# Patient Record
Sex: FEMALE | Race: BLACK OR AFRICAN AMERICAN | NOT HISPANIC OR LATINO | Employment: FULL TIME | URBAN - METROPOLITAN AREA
[De-identification: names, ages, dates, MRNs, and addresses within clinical notes are randomized per-mention and may not be internally consistent; named-entity substitution may affect disease eponyms.]

---

## 2023-04-07 ENCOUNTER — OFFICE VISIT (OUTPATIENT)
Dept: PRIMARY CARE | Facility: CLINIC | Age: 63
End: 2023-04-07
Payer: COMMERCIAL

## 2023-04-07 VITALS
BODY MASS INDEX: 23.23 KG/M2 | SYSTOLIC BLOOD PRESSURE: 127 MMHG | HEART RATE: 79 BPM | WEIGHT: 148 LBS | HEIGHT: 67 IN | TEMPERATURE: 98.1 F | DIASTOLIC BLOOD PRESSURE: 80 MMHG

## 2023-04-07 DIAGNOSIS — I10 PRIMARY HYPERTENSION: Chronic | ICD-10-CM

## 2023-04-07 DIAGNOSIS — D72.819 LEUKOPENIA, UNSPECIFIED TYPE: ICD-10-CM

## 2023-04-07 DIAGNOSIS — R73.03 PREDIABETES: ICD-10-CM

## 2023-04-07 DIAGNOSIS — E78.00 PURE HYPERCHOLESTEROLEMIA: Primary | ICD-10-CM

## 2023-04-07 DIAGNOSIS — M54.12 CERVICAL RADICULOPATHY: ICD-10-CM

## 2023-04-07 DIAGNOSIS — E55.9 VITAMIN D DEFICIENCY: ICD-10-CM

## 2023-04-07 PROBLEM — J30.9 ALLERGIC RHINITIS: Status: ACTIVE | Noted: 2023-04-07

## 2023-04-07 PROBLEM — E78.5 HYPERLIPEMIA: Status: ACTIVE | Noted: 2023-04-07

## 2023-04-07 PROBLEM — H81.10 BENIGN POSITIONAL VERTIGO: Status: ACTIVE | Noted: 2023-04-07

## 2023-04-07 PROBLEM — K64.9 HEMORRHOIDS: Status: ACTIVE | Noted: 2023-04-07

## 2023-04-07 PROBLEM — B35.6 TINEA CRURIS: Status: ACTIVE | Noted: 2023-04-07

## 2023-04-07 PROBLEM — K62.5 RECTAL BLEED: Status: ACTIVE | Noted: 2023-04-07

## 2023-04-07 PROBLEM — R93.1 ABNORMAL CARDIAC CT ANGIOGRAPHY: Status: ACTIVE | Noted: 2023-04-07

## 2023-04-07 PROCEDURE — 3079F DIAST BP 80-89 MM HG: CPT | Performed by: FAMILY MEDICINE

## 2023-04-07 PROCEDURE — 3074F SYST BP LT 130 MM HG: CPT | Performed by: FAMILY MEDICINE

## 2023-04-07 PROCEDURE — 99214 OFFICE O/P EST MOD 30 MIN: CPT | Performed by: FAMILY MEDICINE

## 2023-04-07 PROCEDURE — 1036F TOBACCO NON-USER: CPT | Performed by: FAMILY MEDICINE

## 2023-04-07 RX ORDER — ATORVASTATIN CALCIUM 40 MG/1
40 TABLET, FILM COATED ORAL NIGHTLY
COMMUNITY
End: 2023-04-25

## 2023-04-07 RX ORDER — EZETIMIBE 10 MG/1
10 TABLET ORAL DAILY
COMMUNITY
End: 2023-05-10

## 2023-04-07 RX ORDER — HYDROCHLOROTHIAZIDE 25 MG/1
50 TABLET ORAL
COMMUNITY
Start: 2016-02-25

## 2023-04-07 RX ORDER — ASPIRIN 81 MG/1
81 TABLET ORAL DAILY
COMMUNITY
Start: 2020-07-07

## 2023-04-07 ASSESSMENT — ENCOUNTER SYMPTOMS
OCCASIONAL FEELINGS OF UNSTEADINESS: 1
LOSS OF SENSATION IN FEET: 0
DEPRESSION: 0

## 2023-04-07 ASSESSMENT — PATIENT HEALTH QUESTIONNAIRE - PHQ9
1. LITTLE INTEREST OR PLEASURE IN DOING THINGS: NOT AT ALL
2. FEELING DOWN, DEPRESSED OR HOPELESS: NOT AT ALL
SUM OF ALL RESPONSES TO PHQ9 QUESTIONS 1 AND 2: 0

## 2023-04-07 NOTE — PROGRESS NOTES
Subjective   Patient ID: Tere Moreira is a 63 y.o. female who presents for Follow-up (Pain to right side).  HPI  The patient is here for her medication refills.  She had stopped Atorvastatin for muscle pain, but the pain is more related to her cervicalgia.    She I also here for right neck, right leg and arm pain that started about a year ago and is not better.  It radiates into the right hand.    A review of system was completed.  All systems were reviewed and were normal, except for the ones that are listed in the HPI.    Objective   Physical Exam  Constitutional:       Appearance: Normal appearance.   HENT:      Head: Normocephalic and atraumatic.      Right Ear: Tympanic membrane, ear canal and external ear normal.      Left Ear: Tympanic membrane, ear canal and external ear normal.      Nose: Nose normal.      Mouth/Throat:      Mouth: Mucous membranes are moist.      Pharynx: Oropharynx is clear.   Eyes:      Extraocular Movements: Extraocular movements intact.      Conjunctiva/sclera: Conjunctivae normal.      Pupils: Pupils are equal, round, and reactive to light.   Cardiovascular:      Rate and Rhythm: Normal rate and regular rhythm.      Pulses: Normal pulses.   Pulmonary:      Effort: Pulmonary effort is normal.      Breath sounds: Normal breath sounds.   Abdominal:      General: Abdomen is flat. Bowel sounds are normal.      Palpations: Abdomen is soft.   Musculoskeletal:         General: Normal range of motion.      Cervical back: Normal range of motion and neck supple.   Skin:     General: Skin is warm.   Neurological:      General: No focal deficit present.      Mental Status: She is alert and oriented to person, place, and time. Mental status is at baseline.   Psychiatric:         Mood and Affect: Mood normal.         Behavior: Behavior normal.         Thought Content: Thought content normal.         Judgment: Judgment normal.         Assessment/Plan   Problem List Items Addressed This Visit           Nervous    Cervical radiculopathy     -PT referral recommended today.  Ordered.   -OTC NSAIDs PRN for now.            Circulatory    HTN (hypertension) (Chronic)       Endocrine/Metabolic    Prediabetes    Vitamin D deficiency       Hematologic    Leucopenia       Other    Hyperlipemia - Primary

## 2023-06-21 ENCOUNTER — OFFICE VISIT (OUTPATIENT)
Dept: PRIMARY CARE | Facility: CLINIC | Age: 63
End: 2023-06-21
Payer: COMMERCIAL

## 2023-06-21 VITALS
HEART RATE: 75 BPM | BODY MASS INDEX: 23.34 KG/M2 | WEIGHT: 149 LBS | SYSTOLIC BLOOD PRESSURE: 100 MMHG | TEMPERATURE: 97.9 F | DIASTOLIC BLOOD PRESSURE: 62 MMHG

## 2023-06-21 DIAGNOSIS — G89.29 CHRONIC PAIN OF BOTH KNEES: Primary | ICD-10-CM

## 2023-06-21 DIAGNOSIS — M25.562 CHRONIC PAIN OF BOTH KNEES: Primary | ICD-10-CM

## 2023-06-21 DIAGNOSIS — M25.561 CHRONIC PAIN OF BOTH KNEES: Primary | ICD-10-CM

## 2023-06-21 PROCEDURE — 1036F TOBACCO NON-USER: CPT | Performed by: FAMILY MEDICINE

## 2023-06-21 PROCEDURE — 99214 OFFICE O/P EST MOD 30 MIN: CPT | Performed by: FAMILY MEDICINE

## 2023-06-21 PROCEDURE — 3078F DIAST BP <80 MM HG: CPT | Performed by: FAMILY MEDICINE

## 2023-06-21 PROCEDURE — 3074F SYST BP LT 130 MM HG: CPT | Performed by: FAMILY MEDICINE

## 2023-06-21 RX ORDER — DICLOFENAC SODIUM 10 MG/G
4 GEL TOPICAL 4 TIMES DAILY
Qty: 200 G | Refills: 5 | Status: SHIPPED | OUTPATIENT
Start: 2023-06-21 | End: 2023-10-05 | Stop reason: ALTCHOICE

## 2023-06-21 RX ORDER — IBUPROFEN 800 MG/1
800 TABLET ORAL EVERY 8 HOURS PRN
Qty: 60 TABLET | Refills: 5 | Status: SHIPPED | OUTPATIENT
Start: 2023-06-21 | End: 2023-07-21

## 2023-06-21 NOTE — PROGRESS NOTES
Subjective   Patient ID: Tere Moreira is a 63 y.o. female who presents for NEEDS A REFERRAL.  HPI    The patient is here for the management of her bilateral chronic knee pain.  They are progressively getting worse, mostly when m stairs. .  No local swelling.     A review of system was completed.  All systems were reviewed and were normal, except for the ones that are listed in the HPI.    Objective   Physical Exam  Constitutional:       Appearance: Normal appearance.   HENT:      Head: Normocephalic and atraumatic.      Right Ear: Tympanic membrane, ear canal and external ear normal.      Left Ear: Tympanic membrane, ear canal and external ear normal.      Nose: Nose normal.      Mouth/Throat:      Mouth: Mucous membranes are moist.      Pharynx: Oropharynx is clear.   Eyes:      Extraocular Movements: Extraocular movements intact.      Conjunctiva/sclera: Conjunctivae normal.      Pupils: Pupils are equal, round, and reactive to light.   Cardiovascular:      Rate and Rhythm: Normal rate and regular rhythm.      Pulses: Normal pulses.   Pulmonary:      Effort: Pulmonary effort is normal.      Breath sounds: Normal breath sounds.   Abdominal:      General: Abdomen is flat. Bowel sounds are normal.      Palpations: Abdomen is soft.   Musculoskeletal:         General: Normal range of motion.      Cervical back: Normal range of motion and neck supple.      Comments: -Bilateral grinding knees.  No edema.    Skin:     General: Skin is warm.   Neurological:      General: No focal deficit present.      Mental Status: She is alert and oriented to person, place, and time. Mental status is at baseline.   Psychiatric:         Mood and Affect: Mood normal.         Behavior: Behavior normal.         Thought Content: Thought content normal.         Judgment: Judgment normal.         Assessment/Plan   Problem List Items Addressed This Visit          Nervous    Chronic pain of both knees - Primary    Relevant Medications     diclofenac sodium (Voltaren) 1 % gel gel    ibuprofen 800 mg tablet    Other Relevant Orders    XR knee 1-2 views bilateral    Referral to Orthopaedic Surgery

## 2023-07-19 ENCOUNTER — OFFICE VISIT (OUTPATIENT)
Dept: PRIMARY CARE | Facility: CLINIC | Age: 63
End: 2023-07-19
Payer: COMMERCIAL

## 2023-07-19 VITALS
TEMPERATURE: 97.9 F | HEIGHT: 68 IN | DIASTOLIC BLOOD PRESSURE: 76 MMHG | BODY MASS INDEX: 22.58 KG/M2 | HEART RATE: 69 BPM | SYSTOLIC BLOOD PRESSURE: 129 MMHG | WEIGHT: 149 LBS

## 2023-07-19 DIAGNOSIS — L30.9 DERMATITIS: Primary | ICD-10-CM

## 2023-07-19 PROCEDURE — 3078F DIAST BP <80 MM HG: CPT | Performed by: FAMILY MEDICINE

## 2023-07-19 PROCEDURE — 3074F SYST BP LT 130 MM HG: CPT | Performed by: FAMILY MEDICINE

## 2023-07-19 PROCEDURE — 99214 OFFICE O/P EST MOD 30 MIN: CPT | Performed by: FAMILY MEDICINE

## 2023-07-19 PROCEDURE — 1036F TOBACCO NON-USER: CPT | Performed by: FAMILY MEDICINE

## 2023-07-19 RX ORDER — DESONIDE 0.5 MG/G
OINTMENT TOPICAL 2 TIMES DAILY
Qty: 30 G | Refills: 0 | Status: SHIPPED | OUTPATIENT
Start: 2023-07-19 | End: 2023-11-19

## 2023-07-19 RX ORDER — KETOCONAZOLE 20 MG/G
CREAM TOPICAL 2 TIMES DAILY
Qty: 15 G | Refills: 0 | Status: SHIPPED | OUTPATIENT
Start: 2023-07-19 | End: 2023-11-09

## 2023-07-19 RX ORDER — BETAMETHASONE DIPROPIONATE 0.5 MG/G
CREAM TOPICAL 2 TIMES DAILY PRN
Qty: 15 G | Refills: 0 | Status: SHIPPED | OUTPATIENT
Start: 2023-07-19 | End: 2023-11-09

## 2023-07-19 ASSESSMENT — PAIN SCALES - GENERAL: PAINLEVEL: 0-NO PAIN

## 2023-07-19 NOTE — PROGRESS NOTES
Subjective   Patient ID: Tere Moreira is a 63 y.o. female who presents for Rash.  Rash    64 yo AA female with a history of HLP,HTN, here for:   The patient is here for the management of a left palmar wrist rash that started about 2 weeks ago and is not responding to Desonide prescribed last year by her dermatologist for a similar rash on the right upper eyelid.      A review of system was completed.  All systems were reviewed and were normal, except for the ones that are listed in the HPI.    Objective   Physical Exam  Constitutional:       Appearance: Normal appearance.   HENT:      Head: Normocephalic and atraumatic.      Right Ear: Tympanic membrane, ear canal and external ear normal.      Left Ear: Tympanic membrane, ear canal and external ear normal.      Nose: Nose normal.      Mouth/Throat:      Mouth: Mucous membranes are moist.      Pharynx: Oropharynx is clear.   Eyes:      Extraocular Movements: Extraocular movements intact.      Conjunctiva/sclera: Conjunctivae normal.      Pupils: Pupils are equal, round, and reactive to light.   Cardiovascular:      Rate and Rhythm: Normal rate and regular rhythm.      Pulses: Normal pulses.   Pulmonary:      Effort: Pulmonary effort is normal.      Breath sounds: Normal breath sounds.   Abdominal:      General: Abdomen is flat. Bowel sounds are normal.      Palpations: Abdomen is soft.   Musculoskeletal:         General: Normal range of motion.      Cervical back: Normal range of motion and neck supple.   Skin:     General: Skin is warm.   Neurological:      General: No focal deficit present.      Mental Status: She is alert and oriented to person, place, and time. Mental status is at baseline.   Psychiatric:         Mood and Affect: Mood normal.         Behavior: Behavior normal.         Thought Content: Thought content normal.         Judgment: Judgment normal.         Assessment/Plan   Problem List Items Addressed This Visit       Dermatitis - Primary      -Etiology unclear.          Relevant Medications    desonide (DesOwen) 0.05 % ointment    betamethasone dipropionate 0.05 % cream    ketoconazole (NIZOral) 2 % cream

## 2023-10-05 ENCOUNTER — LAB (OUTPATIENT)
Dept: LAB | Facility: LAB | Age: 63
End: 2023-10-05
Payer: COMMERCIAL

## 2023-10-05 ENCOUNTER — OFFICE VISIT (OUTPATIENT)
Dept: PRIMARY CARE | Facility: CLINIC | Age: 63
End: 2023-10-05
Payer: COMMERCIAL

## 2023-10-05 VITALS
BODY MASS INDEX: 23.15 KG/M2 | WEIGHT: 150 LBS | HEART RATE: 69 BPM | TEMPERATURE: 98 F | SYSTOLIC BLOOD PRESSURE: 110 MMHG | DIASTOLIC BLOOD PRESSURE: 69 MMHG

## 2023-10-05 DIAGNOSIS — Z13.1 DIABETES MELLITUS SCREENING: ICD-10-CM

## 2023-10-05 DIAGNOSIS — E78.5 HYPERLIPIDEMIA, UNSPECIFIED: ICD-10-CM

## 2023-10-05 DIAGNOSIS — Z12.4 ENCOUNTER FOR PAPANICOLAOU SMEAR FOR CERVICAL CANCER SCREENING: ICD-10-CM

## 2023-10-05 DIAGNOSIS — Z13.220 SCREENING CHOLESTEROL LEVEL: ICD-10-CM

## 2023-10-05 DIAGNOSIS — E78.00 PURE HYPERCHOLESTEROLEMIA: ICD-10-CM

## 2023-10-05 DIAGNOSIS — D22.9 CHANGE IN SKIN MOLE: ICD-10-CM

## 2023-10-05 DIAGNOSIS — I10 PRIMARY HYPERTENSION: Chronic | ICD-10-CM

## 2023-10-05 DIAGNOSIS — Z11.4 ENCOUNTER FOR SCREENING FOR HIV: ICD-10-CM

## 2023-10-05 DIAGNOSIS — R73.03 PREDIABETES: ICD-10-CM

## 2023-10-05 DIAGNOSIS — Z78.0 ASYMPTOMATIC MENOPAUSE: ICD-10-CM

## 2023-10-05 DIAGNOSIS — Z12.31 VISIT FOR SCREENING MAMMOGRAM: ICD-10-CM

## 2023-10-05 DIAGNOSIS — Z11.59 ENCOUNTER FOR HEPATITIS C SCREENING TEST FOR LOW RISK PATIENT: ICD-10-CM

## 2023-10-05 DIAGNOSIS — Z23 IMMUNIZATION DUE: ICD-10-CM

## 2023-10-05 DIAGNOSIS — G89.29 CHRONIC PAIN OF BOTH KNEES: ICD-10-CM

## 2023-10-05 DIAGNOSIS — Z00.00 HEALTH CARE MAINTENANCE: Primary | ICD-10-CM

## 2023-10-05 DIAGNOSIS — I10 ESSENTIAL (PRIMARY) HYPERTENSION: ICD-10-CM

## 2023-10-05 DIAGNOSIS — Z00.00 HEALTH CARE MAINTENANCE: ICD-10-CM

## 2023-10-05 DIAGNOSIS — M25.561 CHRONIC PAIN OF BOTH KNEES: ICD-10-CM

## 2023-10-05 DIAGNOSIS — Z12.11 COLON CANCER SCREENING: ICD-10-CM

## 2023-10-05 DIAGNOSIS — M25.562 CHRONIC PAIN OF BOTH KNEES: ICD-10-CM

## 2023-10-05 PROCEDURE — 86803 HEPATITIS C AB TEST: CPT

## 2023-10-05 PROCEDURE — 90471 IMMUNIZATION ADMIN: CPT | Performed by: FAMILY MEDICINE

## 2023-10-05 PROCEDURE — 90472 IMMUNIZATION ADMIN EACH ADD: CPT | Performed by: FAMILY MEDICINE

## 2023-10-05 PROCEDURE — 99396 PREV VISIT EST AGE 40-64: CPT | Performed by: FAMILY MEDICINE

## 2023-10-05 PROCEDURE — 90715 TDAP VACCINE 7 YRS/> IM: CPT | Performed by: FAMILY MEDICINE

## 2023-10-05 PROCEDURE — 3078F DIAST BP <80 MM HG: CPT | Performed by: FAMILY MEDICINE

## 2023-10-05 PROCEDURE — 87389 HIV-1 AG W/HIV-1&-2 AB AG IA: CPT

## 2023-10-05 PROCEDURE — 84443 ASSAY THYROID STIM HORMONE: CPT

## 2023-10-05 PROCEDURE — 83036 HEMOGLOBIN GLYCOSYLATED A1C: CPT

## 2023-10-05 PROCEDURE — 80053 COMPREHEN METABOLIC PANEL: CPT

## 2023-10-05 PROCEDURE — 36415 COLL VENOUS BLD VENIPUNCTURE: CPT

## 2023-10-05 PROCEDURE — 90686 IIV4 VACC NO PRSV 0.5 ML IM: CPT | Performed by: FAMILY MEDICINE

## 2023-10-05 PROCEDURE — 1036F TOBACCO NON-USER: CPT | Performed by: FAMILY MEDICINE

## 2023-10-05 PROCEDURE — 85027 COMPLETE CBC AUTOMATED: CPT

## 2023-10-05 PROCEDURE — 3074F SYST BP LT 130 MM HG: CPT | Performed by: FAMILY MEDICINE

## 2023-10-05 PROCEDURE — 83721 ASSAY OF BLOOD LIPOPROTEIN: CPT

## 2023-10-05 RX ORDER — LOSARTAN POTASSIUM AND HYDROCHLOROTHIAZIDE 12.5; 5 MG/1; MG/1
1 TABLET ORAL DAILY
Qty: 90 TABLET | Refills: 1 | Status: SHIPPED | OUTPATIENT
Start: 2023-10-05 | End: 2024-04-08

## 2023-10-05 RX ORDER — ATORVASTATIN CALCIUM 40 MG/1
40 TABLET, FILM COATED ORAL NIGHTLY
Qty: 90 TABLET | Refills: 1 | Status: SHIPPED | OUTPATIENT
Start: 2023-10-05 | End: 2024-05-07

## 2023-10-05 RX ORDER — EZETIMIBE 10 MG/1
10 TABLET ORAL DAILY
Qty: 90 TABLET | Refills: 1 | Status: SHIPPED | OUTPATIENT
Start: 2023-10-05 | End: 2024-05-06

## 2023-10-05 NOTE — ASSESSMENT & PLAN NOTE
-Well controlled.  -Atorvastatin 40 mg at bedtime and Zetia 10 mg every day refilled.  -Blood test ordered.

## 2023-10-05 NOTE — ASSESSMENT & PLAN NOTE
-mammogram 8/2023- wnl.  -pap smear 2019- wnl- no HPV.  -colonoscopy 2020. Repeat in 10y.  -blood test ordered.  -Influenza and tdap vaccines given.  -COVID 19 and Shingrix to get at the pharmacy.  -Eye exam 8/2023.

## 2023-10-05 NOTE — PROGRESS NOTES
Subjective   Patient ID: Tere Moreira is a 63 y.o. female who presents for Annual Exam.  HPI    62 yo AA female with a history of HLP,HTN, here for:     1- CPE.   2-  Medications refill.  3-  orthopedic surgeon referral for the management of her chronic knee pain.  4- left anterior ankle pigmented mole that started to increase two years ago.     A review of system was completed.  All systems were reviewed and were normal, except for the ones that are listed in the HPI.    Objective   Physical Exam  Constitutional:       Appearance: Normal appearance.   HENT:      Head: Normocephalic and atraumatic.      Right Ear: Tympanic membrane, ear canal and external ear normal.      Left Ear: Tympanic membrane, ear canal and external ear normal.      Nose: Nose normal.      Mouth/Throat:      Mouth: Mucous membranes are moist.      Pharynx: Oropharynx is clear.   Eyes:      Extraocular Movements: Extraocular movements intact.      Conjunctiva/sclera: Conjunctivae normal.      Pupils: Pupils are equal, round, and reactive to light.   Cardiovascular:      Rate and Rhythm: Normal rate and regular rhythm.      Pulses: Normal pulses.   Pulmonary:      Effort: Pulmonary effort is normal.      Breath sounds: Normal breath sounds.   Abdominal:      General: Abdomen is flat. Bowel sounds are normal.      Palpations: Abdomen is soft.   Musculoskeletal:         General: Normal range of motion.      Cervical back: Normal range of motion and neck supple.   Skin:     General: Skin is warm.      Comments: - left anterior ankle pigmented mole that started to increase two years ago.  Measuring about 1 cm of diameter.      Neurological:      General: No focal deficit present.      Mental Status: She is alert and oriented to person, place, and time. Mental status is at baseline.   Psychiatric:         Mood and Affect: Mood normal.         Behavior: Behavior normal.         Thought Content: Thought content normal.         Judgment:  Judgment normal.     Assessment/Plan   Problem List Items Addressed This Visit       Hyperlipemia     -Well controlled.  -Atorvastatin 40 mg at bedtime and Zetia 10 mg every day refilled.  -Blood test ordered.          HTN (hypertension) (Chronic)     -well controlled.  -medications refilled: Losartan/ hydrochlorothiazide 50/12.5 mg every day.          Prediabetes    Relevant Medications    atorvastatin (Lipitor) 40 mg tablet    Chronic pain of both knees     -Knee X rays reviewed.  -ortho referral done.          Relevant Orders    Referral to Orthopaedic Surgery    Health care maintenance - Primary     -mammogram 8/2023- wnl.  -pap smear 2019- wnl- no HPV.  -colonoscopy 2020. Repeat in 10y.  -blood test ordered.  -Influenza and tdap vaccines given.  -COVID 19 and Shingrix to get at the pharmacy.  -Eye exam 8/2023.          Relevant Orders    CBC    Comprehensive Metabolic Panel    Hemoglobin A1C    TSH with reflex to Free T4 if abnormal    Hepatitis C Antibody    HIV 1/2 Antigen/Antibody Screen with Reflex to Confirmation    XR DEXA bone density    Visit for screening mammogram    Encounter for screening for HIV    Relevant Orders    HIV 1/2 Antigen/Antibody Screen with Reflex to Confirmation    Encounter for hepatitis C screening test for low risk patient    Relevant Orders    Hepatitis C Antibody    Asymptomatic menopause    Relevant Orders    XR DEXA bone density    Colon cancer screening    Screening cholesterol level    Diabetes mellitus screening    Encounter for Papanicolaou smear for cervical cancer screening    Immunization due    Relevant Medications    zoster vaccine-recombinant adjuvanted (Shingrix) 50 mcg/0.5 mL vaccine    Essential (primary) hypertension    Relevant Medications    losartan-hydrochlorothiazide (Hyzaar) 50-12.5 mg tablet    Hyperlipidemia, unspecified    Relevant Medications    ezetimibe (Zetia) 10 mg tablet    Other Relevant Orders    LDL cholesterol, direct    Change in skin mole      -Increased in size.  Measuring about 1 cm of diameter.          Relevant Orders    Referral to Dermatology      Patient to return to office in 6 months.

## 2023-10-06 LAB
ALBUMIN SERPL BCP-MCNC: 4.6 G/DL (ref 3.4–5)
ALP SERPL-CCNC: 50 U/L (ref 33–136)
ALT SERPL W P-5'-P-CCNC: 22 U/L (ref 7–45)
ANION GAP SERPL CALC-SCNC: 13 MMOL/L (ref 10–20)
AST SERPL W P-5'-P-CCNC: 27 U/L (ref 9–39)
BILIRUB SERPL-MCNC: 0.4 MG/DL (ref 0–1.2)
BUN SERPL-MCNC: 23 MG/DL (ref 6–23)
CALCIUM SERPL-MCNC: 9.9 MG/DL (ref 8.6–10.6)
CHLORIDE SERPL-SCNC: 101 MMOL/L (ref 98–107)
CO2 SERPL-SCNC: 29 MMOL/L (ref 21–32)
CREAT SERPL-MCNC: 0.89 MG/DL (ref 0.5–1.05)
ERYTHROCYTE [DISTWIDTH] IN BLOOD BY AUTOMATED COUNT: 13.4 % (ref 11.5–14.5)
EST. AVERAGE GLUCOSE BLD GHB EST-MCNC: 123 MG/DL
GFR SERPL CREATININE-BSD FRML MDRD: 73 ML/MIN/1.73M*2
GLUCOSE SERPL-MCNC: 84 MG/DL (ref 74–99)
HBA1C MFR BLD: 5.9 %
HCT VFR BLD AUTO: 39.1 % (ref 36–46)
HCV AB SER QL: NONREACTIVE
HGB BLD-MCNC: 12.3 G/DL (ref 12–16)
HIV 1+2 AB+HIV1 P24 AG SERPL QL IA: NONREACTIVE
LDLC SERPL DIRECT ASSAY-MCNC: 45 MG/DL (ref 0–129)
MCH RBC QN AUTO: 27.3 PG (ref 26–34)
MCHC RBC AUTO-ENTMCNC: 31.5 G/DL (ref 32–36)
MCV RBC AUTO: 87 FL (ref 80–100)
NRBC BLD-RTO: 0 /100 WBCS (ref 0–0)
PLATELET # BLD AUTO: 239 X10*3/UL (ref 150–450)
PMV BLD AUTO: 12.8 FL (ref 7.5–11.5)
POTASSIUM SERPL-SCNC: 4.3 MMOL/L (ref 3.5–5.3)
PROT SERPL-MCNC: 7.5 G/DL (ref 6.4–8.2)
RBC # BLD AUTO: 4.51 X10*6/UL (ref 4–5.2)
SODIUM SERPL-SCNC: 139 MMOL/L (ref 136–145)
TSH SERPL-ACNC: 1.77 MIU/L (ref 0.44–3.98)
WBC # BLD AUTO: 3.8 X10*3/UL (ref 4.4–11.3)

## 2023-11-07 DIAGNOSIS — L30.9 DERMATITIS: ICD-10-CM

## 2023-11-09 RX ORDER — KETOCONAZOLE 20 MG/G
CREAM TOPICAL 2 TIMES DAILY
Qty: 15 G | Refills: 1 | Status: SHIPPED | OUTPATIENT
Start: 2023-11-09

## 2023-11-09 RX ORDER — BETAMETHASONE DIPROPIONATE 0.5 MG/G
CREAM TOPICAL 2 TIMES DAILY PRN
Qty: 15 G | Refills: 1 | Status: SHIPPED | OUTPATIENT
Start: 2023-11-09 | End: 2024-11-08

## 2023-11-16 DIAGNOSIS — L30.9 DERMATITIS: ICD-10-CM

## 2023-11-19 RX ORDER — DESONIDE 0.5 MG/G
OINTMENT TOPICAL 2 TIMES DAILY
Qty: 30 G | Refills: 2 | Status: SHIPPED | OUTPATIENT
Start: 2023-11-19

## 2023-11-20 ENCOUNTER — OFFICE VISIT (OUTPATIENT)
Dept: ORTHOPEDIC SURGERY | Facility: HOSPITAL | Age: 63
End: 2023-11-20
Payer: COMMERCIAL

## 2023-11-20 VITALS — BODY MASS INDEX: 23.54 KG/M2 | HEIGHT: 67 IN | WEIGHT: 150 LBS

## 2023-11-20 DIAGNOSIS — M25.561 CHRONIC PAIN OF BOTH KNEES: ICD-10-CM

## 2023-11-20 DIAGNOSIS — M17.0 PRIMARY OSTEOARTHRITIS OF BOTH KNEES: Primary | ICD-10-CM

## 2023-11-20 DIAGNOSIS — G89.29 CHRONIC PAIN OF BOTH KNEES: ICD-10-CM

## 2023-11-20 DIAGNOSIS — M25.562 CHRONIC PAIN OF BOTH KNEES: ICD-10-CM

## 2023-11-20 PROCEDURE — 1036F TOBACCO NON-USER: CPT | Performed by: EMERGENCY MEDICINE

## 2023-11-20 PROCEDURE — 99214 OFFICE O/P EST MOD 30 MIN: CPT | Performed by: EMERGENCY MEDICINE

## 2023-11-20 PROCEDURE — 99204 OFFICE O/P NEW MOD 45 MIN: CPT | Performed by: EMERGENCY MEDICINE

## 2023-11-20 RX ORDER — MELOXICAM 7.5 MG/1
7.5 TABLET ORAL 2 TIMES DAILY PRN
Qty: 30 TABLET | Refills: 2 | Status: SHIPPED | OUTPATIENT
Start: 2023-11-20 | End: 2024-02-18

## 2023-11-20 ASSESSMENT — PAIN - FUNCTIONAL ASSESSMENT: PAIN_FUNCTIONAL_ASSESSMENT: 0-10

## 2023-11-20 ASSESSMENT — PAIN SCALES - GENERAL: PAINLEVEL_OUTOF10: 8

## 2023-11-20 ASSESSMENT — PAIN DESCRIPTION - DESCRIPTORS: DESCRIPTORS: SHARP

## 2023-11-20 NOTE — PROGRESS NOTES
"Subjective   Tere Moreira is a 63 y.o. female who presents for Pain of the Right Knee and Pain of the Left Knee    HPI  63-year-old female referred by Dr. Misael Cotto for bilateral knee pain that she had for the past 9 months.  Pain is exacerbated by stairs and deep knee flexion.  Pain is temporarily alleviated by rest.  Pain is associated with clicking and anterior knee irritation.  Pain is not associated with acute trauma, fall, ecchymosis, paresthesias, weakness, rash, erythema, or fevers.    ROS: All pertinent positive symptoms are included in the history of present illness.    All other systems have been reviewed and are negative and noncontributory to this patient's current ailments.    Objective     Vitals:    11/20/23 1353   Weight: 68 kg (150 lb)   Height: 1.702 m (5' 7\")       Physical Exam  General/Constitutional: No apparent distress. Well-nourished and well developed.  Head: Normocephalic, Atraumatic.   Eyes: EOMI.  Vascular: No edema, swelling or tenderness, except as noted in detailed exam.  Respiratory: Non-labored breathing.  Integumentary: No impressive skin lesions present, except as noted in detailed exam.  Neurological: Alert and oriented.  Psychological:  Normal mood and affect.  Musculoskeletal: Normal, except as noted in detailed exam.  Right Knee  Flexion 130. Extension 0. Crepitus present with knee flexion/extension. No ecchymosis. Muscle strength 5 out of 5 with flexion and extension. Lachman negative. Anterior drawer negative. Posterior drawer negative. Valgus stress negative. Varus stress negative.  Apprehension positive.    Left Knee  Flexion 130. Extension 0. Crepitus present with knee flexion/extension. No ecchymosis. Muscle strength 5 out of 5 with flexion and extension. Lachman negative. Anterior drawer negative. Posterior drawer negative. Valgus stress negative. Varus stress negative.  Apprehension positive.    XR KNEE 3 VIEWS BILATERAL    - Impression -  Patellofemoral " predominant degenerative changes of the knees.    Assessment/Plan   Problem List Items Addressed This Visit       Chronic pain of both knees     Other Visit Diagnoses       Primary osteoarthritis of both knees    -  Primary    Relevant Medications    meloxicam (Mobic) 7.5 mg tablet    Other Relevant Orders    Referral to Physical Therapy          I discussed with patient I feel that her symptoms are likely due to patellofemoral DJD bilaterally.  I feel that we can treat this conservatively with activity modifications, physical therapy, and analgesics.  We did discuss the option of therapeutic corticosteroid injection therapy, however she prefers to hold off at this time.  Therefore, we had a longer discussion regarding topical Voltaren gel versus oral meloxicam.  She would like to try oral meloxicam and physical therapy.  I have sent a prescription for meloxicam 7.5 mg twice daily for pain.  I have given her referral for physical therapy.  I like to see her back as needed pain persists or worsens.  She is not improving at time, we will likely again discussed the option of therapeutic corticosteroid junction therapy versus total joint placement.    Jaziel Ferguson,   Sports Medicine  [facility]     ** Please excuse any errors in grammar or translation related to this dictation. Voice recognition software was utilized to prepare this document. **

## 2024-01-02 ENCOUNTER — APPOINTMENT (OUTPATIENT)
Dept: PHYSICAL THERAPY | Facility: CLINIC | Age: 64
End: 2024-01-02
Payer: COMMERCIAL

## 2024-01-03 ENCOUNTER — EVALUATION (OUTPATIENT)
Dept: PHYSICAL THERAPY | Facility: CLINIC | Age: 64
End: 2024-01-03
Payer: COMMERCIAL

## 2024-01-03 DIAGNOSIS — R53.1 WEAKNESS: ICD-10-CM

## 2024-01-03 DIAGNOSIS — G89.29 CHRONIC PAIN OF BOTH KNEES: Primary | ICD-10-CM

## 2024-01-03 DIAGNOSIS — M25.562 CHRONIC PAIN OF BOTH KNEES: Primary | ICD-10-CM

## 2024-01-03 DIAGNOSIS — M17.0 PRIMARY OSTEOARTHRITIS OF BOTH KNEES: ICD-10-CM

## 2024-01-03 DIAGNOSIS — M25.561 CHRONIC PAIN OF BOTH KNEES: Primary | ICD-10-CM

## 2024-01-03 PROCEDURE — 97110 THERAPEUTIC EXERCISES: CPT | Mod: GP | Performed by: PHYSICAL THERAPIST

## 2024-01-03 PROCEDURE — 97161 PT EVAL LOW COMPLEX 20 MIN: CPT | Mod: GP | Performed by: PHYSICAL THERAPIST

## 2024-01-03 ASSESSMENT — ENCOUNTER SYMPTOMS
LOSS OF SENSATION IN FEET: 0
DEPRESSION: 0
OCCASIONAL FEELINGS OF UNSTEADINESS: 1

## 2024-01-03 NOTE — PROGRESS NOTES
Physical Therapy  Physical Therapy Orthopedic Evaluation    Patient Name: Tere Moreira  MRN: [id number]  Today's Date: 1/3/2024  Time Calculation  Start Time: 1546  Stop Time: 1626  Time Calculation (min): 40 min    Insurance:  Visit number: 1 of 60 (PT, OT, SP)  Authorization info: Auth REQ  Insurance Type: [employer]   Cert dates: 1/3/2024 - 4/2/2024    General:  Reason for visit: BL knee pain; BL patellofemoral DJD   Referred by: Dr. Ferguson    Current Problem  1. Chronic pain of both knees        2. Weakness  Referral to Physical Therapy      3. Primary osteoarthritis of both knees            Precautions: high blood pressure   Precautions  STEADI Fall Risk Score (The score of 4 or more indicates an increased risk of falling): 4    Medical History Form: Reviewed (scanned into chart)    Subjective:     Chief Complaint: Patient presents to clinic with BL knee pain. She used to perform step aerobics regularly. She now has difficulty with just about any exercise that she once used to be able to perform. She has noticed that in the past 2 years or so, she has had to modify all her exercises.   Onset Date: 1/1/2022  KRISTEN: Chronic    Current Condition:   Worse    Pain:  Pain Score: 10 - Worst possible pain  Pain Location: Knee10/10 without modification (pain is worst with chronic stair negotiation), at rest pain is 0/10  Location: anterior BL knees   Description: sharp, seldom experiences catching and clicking  Aggravating Factors: Stair Negotiation, Running, and Jumping  Relieving Factors:  Rest    Relevant Information (PMH & Previous Tests/Imaging): xray: Patellofemoral predominant degenerative changes of the knees.  (6/2023)  Previous Interventions/Treatments: None    Prior Level of Function (PLOF): 90%  Patient previously independent with all ADLs  Exercise/Physical Activity: exercises with modifications   Work/School: Teacher (Full time)    Patients Living Environment: Reviewed and no concern    Primary Language:  "English    Patient's Goal(s) for Therapy: \"Do burpees but realistically I would like to jog again\"    Red Flags: Do you have any of the following? No  Fever/chills, unexplained weight changes, dizziness/fainting, unexplained change in bowel or bladder functions, unexplained malaise or muscle weakness, night pain/sweats, numbness or tingling      Objective:  Objective       ROM    Knee AROM (Degrees)      (R)  (L)  Flexion: 130  128      Extension: 0  Lacking 3 degrees                   Strength Testing    Hip    (R)  (L)  Flexion: 4+/5  4+/5      Extension: 4/5  4/5     Abduction: 4-/5  4-/5     Adduction: 4+/5  4+/5           Knee    (R)  (L)  Flexion: 5-/5  5-/5      Extension: 4-/5  4-/5         Ankle  WNL        Functional Screening  Squat: WNL (Only reports muscle soreness from her workout this morning)  Lunge: unable to perform due to pain   SL Balance: able to hold about 5 seconds on RLE, and 10+ seconds on LLE   Lateral Step Down: Unable to perform   SL Quarter Squat: Unable to perform       Palpation: (-)      Flexibility:     R hamstring: WNL  L hamstring: WNL  R quad: Severe limitation   L quad: Severe limitation       Patella Mobility: Mild limitation in all directions BL       Ankle Joint Mobility: WNL      Orthotics: n/a      Gait: WNL          Special Tests  Meniscus   Lakesha Test: -   Apleys Test:    Thessaly Test: -  Patella   Apprehension Test: -   Grind Test: -    No signs or symptoms of DVT      Outcome Measures:  Other Measures  Lower Extremity Funtional Score (LEFS): 54/80     EDUCATION: home exercise program, plan of care, activity modifications, pain management, and injury pathology       Goals: Set and discussed today  Active       PT Problem       PT Goal 1       Start:  01/04/24    Expected End:  04/02/24       In 4 weeks, patient will improve L knee extension ROM to at least 0 degrees in order to progress towards heel to toe gait.   In 4 weeks, patient will improve SL balance to at " least 10 seconds on RLE in order to safely ambulate in community without being at a risk for falls.   In 4 weeks, patient will report a decrease in pain to <5/10 in order to tolerate exercise.   In 6 weeks, patient will demonstrate improved LE strength by ½ grade in order to perform reciprocal pattern with stair negotiation.   By discharge, patient will demonstrate improved LEFS by 9.  By discharge, patient will be independent with final HEP.                Plan of care was developed with input and agreement by the patient      Treatment Performed:  Access Code: [email or web address]  URL: [email or web address]  Date: 01/03/2024  Prepared by: Setfani Brown    Exercises  - Supine Active Straight Leg Raise  - 1 x daily - 7 x weekly - 2 sets - 10 reps  - Supine Bridge  - 1 x daily - 7 x weekly - 2 sets - 10 reps  - Clamshell  - 1 x daily - 7 x weekly - 2 sets - 10 reps  - Sidelying Hip Abduction  - 1 x daily - 7 x weekly - 2 sets - 10 reps  - Prone Quadriceps Stretch with Strap  - 1 x daily - 7 x weekly - 1 sets - 2 reps - 30-60 second hold      Assessment: Patient presents with BL knee pain, resulting in limited participation in pain-free ADLs and inability to perform at their prior level of function. Pt would benefit from physical therapy to address the impairments found & listed previously in the objective section in order to return to safe and pain-free ADLs and prior level of function.         Plan:     Planned Interventions include: therapeutic exercise, self-care home management, manual therapy, therapeutic activities, gait training, neuromuscular coordination, vasopneumatic, dry needling, aquatic therapy  Frequency: 1-2 x Week  Duration: 8 Weeks      Stefani Brown, PT

## 2024-01-04 PROBLEM — M17.0 PRIMARY OSTEOARTHRITIS OF BOTH KNEES: Status: ACTIVE | Noted: 2024-01-04

## 2024-01-04 ASSESSMENT — PAIN SCALES - GENERAL: PAINLEVEL_OUTOF10: 10 - WORST POSSIBLE PAIN

## 2024-01-09 ENCOUNTER — APPOINTMENT (OUTPATIENT)
Dept: PHYSICAL THERAPY | Facility: CLINIC | Age: 64
End: 2024-01-09
Payer: COMMERCIAL

## 2024-01-15 ENCOUNTER — APPOINTMENT (OUTPATIENT)
Dept: PHYSICAL THERAPY | Facility: CLINIC | Age: 64
End: 2024-01-15
Payer: COMMERCIAL

## 2024-01-16 ENCOUNTER — APPOINTMENT (OUTPATIENT)
Dept: PHYSICAL THERAPY | Facility: CLINIC | Age: 64
End: 2024-01-16
Payer: COMMERCIAL

## 2024-01-22 ENCOUNTER — APPOINTMENT (OUTPATIENT)
Dept: PHYSICAL THERAPY | Facility: CLINIC | Age: 64
End: 2024-01-22
Payer: COMMERCIAL

## 2024-01-29 ENCOUNTER — APPOINTMENT (OUTPATIENT)
Dept: PHYSICAL THERAPY | Facility: CLINIC | Age: 64
End: 2024-01-29
Payer: COMMERCIAL

## 2024-01-30 ENCOUNTER — APPOINTMENT (OUTPATIENT)
Dept: PHYSICAL THERAPY | Facility: CLINIC | Age: 64
End: 2024-01-30
Payer: COMMERCIAL

## 2024-02-06 ENCOUNTER — APPOINTMENT (OUTPATIENT)
Dept: PHYSICAL THERAPY | Facility: CLINIC | Age: 64
End: 2024-02-06
Payer: COMMERCIAL

## 2024-02-12 ENCOUNTER — TELEPHONE (OUTPATIENT)
Dept: PRIMARY CARE | Facility: CLINIC | Age: 64
End: 2024-02-12
Payer: COMMERCIAL
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
## 2024-02-13 ENCOUNTER — TELEPHONE (OUTPATIENT)
Dept: PRIMARY CARE | Facility: CLINIC | Age: 64
End: 2024-02-13
Payer: COMMERCIAL

## 2024-02-13 NOTE — TELEPHONE ENCOUNTER
If the patient thinks that she received the saliva into her eyes, hernoseormouth she should wait for signs of infection.  If she noticed any, she should notify the office so that we can treat it accordingly.  Otherwise the most common infections like HIV, hepatitis C, are transmitted through blood to blood contact for the most part.
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
## 2024-03-04 ENCOUNTER — LAB (OUTPATIENT)
Dept: LAB | Facility: LAB | Age: 64
End: 2024-03-04
Payer: COMMERCIAL

## 2024-03-04 ENCOUNTER — OFFICE VISIT (OUTPATIENT)
Dept: PRIMARY CARE | Facility: CLINIC | Age: 64
End: 2024-03-04
Payer: COMMERCIAL

## 2024-03-04 VITALS
DIASTOLIC BLOOD PRESSURE: 69 MMHG | HEART RATE: 76 BPM | BODY MASS INDEX: 23.02 KG/M2 | WEIGHT: 147 LBS | SYSTOLIC BLOOD PRESSURE: 128 MMHG | TEMPERATURE: 98 F

## 2024-03-04 DIAGNOSIS — N30.00 ACUTE CYSTITIS WITHOUT HEMATURIA: Primary | ICD-10-CM

## 2024-03-04 DIAGNOSIS — N30.00 ACUTE CYSTITIS WITHOUT HEMATURIA: ICD-10-CM

## 2024-03-04 PROCEDURE — 87186 SC STD MICRODIL/AGAR DIL: CPT

## 2024-03-04 PROCEDURE — 81001 URINALYSIS AUTO W/SCOPE: CPT

## 2024-03-04 PROCEDURE — 87086 URINE CULTURE/COLONY COUNT: CPT

## 2024-03-04 PROCEDURE — 3074F SYST BP LT 130 MM HG: CPT | Performed by: FAMILY MEDICINE

## 2024-03-04 PROCEDURE — 1036F TOBACCO NON-USER: CPT | Performed by: FAMILY MEDICINE

## 2024-03-04 PROCEDURE — 3078F DIAST BP <80 MM HG: CPT | Performed by: FAMILY MEDICINE

## 2024-03-04 PROCEDURE — 1126F AMNT PAIN NOTED NONE PRSNT: CPT | Performed by: FAMILY MEDICINE

## 2024-03-04 PROCEDURE — 99213 OFFICE O/P EST LOW 20 MIN: CPT | Performed by: FAMILY MEDICINE

## 2024-03-04 RX ORDER — NITROFURANTOIN 25; 75 MG/1; MG/1
100 CAPSULE ORAL 2 TIMES DAILY
Qty: 20 CAPSULE | Refills: 0 | Status: SHIPPED | OUTPATIENT
Start: 2024-03-04 | End: 2024-03-14

## 2024-03-04 NOTE — PROGRESS NOTES
Subjective   Patient ID: Tere Moreira is a 64 y.o. female who presents for UTI.    HPI    65 yo AA female with a history of HLP,HTN, here for a UTI that started yesterday.       A review of system was completed.  All systems were reviewed and were normal, except for the ones that are listed in the HPI.    Objective   Physical Exam  Constitutional:       Appearance: Normal appearance.   HENT:      Head: Normocephalic and atraumatic.      Right Ear: Tympanic membrane, ear canal and external ear normal.      Left Ear: Tympanic membrane, ear canal and external ear normal.      Nose: Nose normal.      Mouth/Throat:      Mouth: Mucous membranes are moist.      Pharynx: Oropharynx is clear.   Eyes:      Extraocular Movements: Extraocular movements intact.      Conjunctiva/sclera: Conjunctivae normal.      Pupils: Pupils are equal, round, and reactive to light.   Cardiovascular:      Rate and Rhythm: Normal rate and regular rhythm.      Pulses: Normal pulses.   Pulmonary:      Effort: Pulmonary effort is normal.      Breath sounds: Normal breath sounds.   Abdominal:      General: Abdomen is flat. Bowel sounds are normal.      Palpations: Abdomen is soft.   Musculoskeletal:         General: Normal range of motion.      Cervical back: Normal range of motion and neck supple.   Skin:     General: Skin is warm.   Neurological:      General: No focal deficit present.      Mental Status: She is alert and oriented to person, place, and time. Mental status is at baseline.   Psychiatric:         Mood and Affect: Mood normal.         Behavior: Behavior normal.         Thought Content: Thought content normal.         Judgment: Judgment normal.     Assessment/Plan   Problem List Items Addressed This Visit       Acute cystitis without hematuria - Primary     -UA w/ C+S ordered.  -Macrobid 100 mg BID for 10 days empirically started.         Relevant Medications    nitrofurantoin, macrocrystal-monohydrate, (Macrobid) 100 mg capsule     Other Relevant Orders    Urine Culture    POCT UA Automated manually resulted    Patient to return to office if not better in 1 week.

## 2024-03-05 LAB
APPEARANCE UR: CLEAR
BACTERIA #/AREA URNS AUTO: ABNORMAL /HPF
BILIRUB UR STRIP.AUTO-MCNC: NEGATIVE MG/DL
COLOR UR: ABNORMAL
GLUCOSE UR STRIP.AUTO-MCNC: NORMAL MG/DL
KETONES UR STRIP.AUTO-MCNC: NEGATIVE MG/DL
LEUKOCYTE ESTERASE UR QL STRIP.AUTO: ABNORMAL
MUCOUS THREADS #/AREA URNS AUTO: ABNORMAL /LPF
NITRITE UR QL STRIP.AUTO: NEGATIVE
PH UR STRIP.AUTO: 5.5 [PH]
PROT UR STRIP.AUTO-MCNC: NEGATIVE MG/DL
RBC # UR STRIP.AUTO: ABNORMAL /UL
RBC #/AREA URNS AUTO: >20 /HPF
SP GR UR STRIP.AUTO: 1.02
UROBILINOGEN UR STRIP.AUTO-MCNC: NORMAL MG/DL
WBC #/AREA URNS AUTO: >50 /HPF
WBC CLUMPS #/AREA URNS AUTO: ABNORMAL /HPF

## 2024-03-07 LAB — BACTERIA UR CULT: ABNORMAL

## 2024-03-15 ENCOUNTER — TELEPHONE (OUTPATIENT)
Dept: PRIMARY CARE | Facility: CLINIC | Age: 64
End: 2024-03-15
Payer: COMMERCIAL

## 2024-03-15 DIAGNOSIS — M25.561 CHRONIC PAIN OF BOTH KNEES: ICD-10-CM

## 2024-03-15 DIAGNOSIS — G89.29 CHRONIC PAIN OF BOTH KNEES: ICD-10-CM

## 2024-03-15 DIAGNOSIS — D22.9 CHANGE IN SKIN MOLE: Primary | ICD-10-CM

## 2024-03-15 DIAGNOSIS — M25.562 CHRONIC PAIN OF BOTH KNEES: ICD-10-CM

## 2024-03-15 NOTE — TELEPHONE ENCOUNTER
The patient called requesting an updated referral for physical therapy and a referral for a dermatologist

## 2024-03-19 ENCOUNTER — HOSPITAL ENCOUNTER (OUTPATIENT)
Dept: RADIOLOGY | Facility: CLINIC | Age: 64
Discharge: HOME | End: 2024-03-19
Payer: COMMERCIAL

## 2024-03-19 DIAGNOSIS — Z78.0 ASYMPTOMATIC MENOPAUSE: ICD-10-CM

## 2024-03-19 DIAGNOSIS — Z00.00 HEALTH CARE MAINTENANCE: ICD-10-CM

## 2024-03-19 PROCEDURE — 77080 DXA BONE DENSITY AXIAL: CPT | Performed by: RADIOLOGY

## 2024-03-19 PROCEDURE — 77080 DXA BONE DENSITY AXIAL: CPT

## 2024-04-01 ENCOUNTER — TELEPHONE (OUTPATIENT)
Dept: PRIMARY CARE | Facility: CLINIC | Age: 64
End: 2024-04-01

## 2024-04-04 DIAGNOSIS — I10 ESSENTIAL (PRIMARY) HYPERTENSION: ICD-10-CM

## 2024-04-05 ENCOUNTER — EVALUATION (OUTPATIENT)
Dept: PHYSICAL THERAPY | Facility: CLINIC | Age: 64
End: 2024-04-05
Payer: COMMERCIAL

## 2024-04-05 ENCOUNTER — OFFICE VISIT (OUTPATIENT)
Dept: PRIMARY CARE | Facility: CLINIC | Age: 64
End: 2024-04-05
Payer: COMMERCIAL

## 2024-04-05 ENCOUNTER — LAB (OUTPATIENT)
Dept: LAB | Facility: LAB | Age: 64
End: 2024-04-05
Payer: COMMERCIAL

## 2024-04-05 VITALS
HEART RATE: 69 BPM | BODY MASS INDEX: 23.49 KG/M2 | TEMPERATURE: 98 F | DIASTOLIC BLOOD PRESSURE: 66 MMHG | WEIGHT: 150 LBS | SYSTOLIC BLOOD PRESSURE: 136 MMHG

## 2024-04-05 DIAGNOSIS — Z12.31 VISIT FOR SCREENING MAMMOGRAM: ICD-10-CM

## 2024-04-05 DIAGNOSIS — G89.29 CHRONIC PAIN OF BOTH KNEES: ICD-10-CM

## 2024-04-05 DIAGNOSIS — R35.0 INCREASED URINARY FREQUENCY: ICD-10-CM

## 2024-04-05 DIAGNOSIS — R35.0 INCREASED URINARY FREQUENCY: Primary | ICD-10-CM

## 2024-04-05 DIAGNOSIS — M25.561 CHRONIC PAIN OF BOTH KNEES: ICD-10-CM

## 2024-04-05 DIAGNOSIS — M25.562 CHRONIC PAIN OF BOTH KNEES: ICD-10-CM

## 2024-04-05 LAB
APPEARANCE UR: CLEAR
BILIRUB UR STRIP.AUTO-MCNC: NEGATIVE MG/DL
COLOR UR: COLORLESS
GLUCOSE UR STRIP.AUTO-MCNC: NORMAL MG/DL
KETONES UR STRIP.AUTO-MCNC: NEGATIVE MG/DL
LEUKOCYTE ESTERASE UR QL STRIP.AUTO: NEGATIVE
NITRITE UR QL STRIP.AUTO: NEGATIVE
PH UR STRIP.AUTO: 6.5 [PH]
PROT UR STRIP.AUTO-MCNC: NEGATIVE MG/DL
RBC # UR STRIP.AUTO: NEGATIVE /UL
SP GR UR STRIP.AUTO: 1.01
UROBILINOGEN UR STRIP.AUTO-MCNC: NORMAL MG/DL

## 2024-04-05 PROCEDURE — 1159F MED LIST DOCD IN RCRD: CPT | Performed by: FAMILY MEDICINE

## 2024-04-05 PROCEDURE — 97535 SELF CARE MNGMENT TRAINING: CPT | Mod: GP | Performed by: PHYSICAL THERAPIST

## 2024-04-05 PROCEDURE — 97110 THERAPEUTIC EXERCISES: CPT | Mod: GP | Performed by: PHYSICAL THERAPIST

## 2024-04-05 PROCEDURE — 81003 URINALYSIS AUTO W/O SCOPE: CPT

## 2024-04-05 PROCEDURE — 97161 PT EVAL LOW COMPLEX 20 MIN: CPT | Mod: GP | Performed by: PHYSICAL THERAPIST

## 2024-04-05 PROCEDURE — 99214 OFFICE O/P EST MOD 30 MIN: CPT | Performed by: FAMILY MEDICINE

## 2024-04-05 PROCEDURE — 87086 URINE CULTURE/COLONY COUNT: CPT

## 2024-04-05 PROCEDURE — 3075F SYST BP GE 130 - 139MM HG: CPT | Performed by: FAMILY MEDICINE

## 2024-04-05 PROCEDURE — 3078F DIAST BP <80 MM HG: CPT | Performed by: FAMILY MEDICINE

## 2024-04-05 RX ORDER — TOLTERODINE 4 MG/1
4 CAPSULE, EXTENDED RELEASE ORAL DAILY
Qty: 30 CAPSULE | Refills: 5 | Status: SHIPPED | OUTPATIENT
Start: 2024-04-05 | End: 2024-10-02

## 2024-04-05 ASSESSMENT — PAIN - FUNCTIONAL ASSESSMENT: PAIN_FUNCTIONAL_ASSESSMENT: 0-10

## 2024-04-05 ASSESSMENT — ENCOUNTER SYMPTOMS
LOSS OF SENSATION IN FEET: 0
OCCASIONAL FEELINGS OF UNSTEADINESS: 1
DEPRESSION: 0

## 2024-04-05 ASSESSMENT — PAIN SCALES - GENERAL: PAINLEVEL_OUTOF10: 0 - NO PAIN

## 2024-04-05 NOTE — PROGRESS NOTES
Physical Therapy  Physical Therapy Orthopedic Evaluation    Patient Name: Tere Moreira  MRN: [id number]  Today's Date: 4/5/2024  Time Calculation  Start Time: 0745  Stop Time: 0830  Time Calculation (min): 45 min    Insurance:  Visit number: 1 of 5  Authorization info: Auth Needed  Insurance Type: [place]  Cert start date: 4/5/24; Cert end date: 6/3/24     General:  Reason for visit: Chronic pain B knees  Referred by: Dr. Iman Jack    Assessment: Patient presents with signs and symptoms consistent with B knee OA and PFJ involvement, resulting in limited participation in pain-free ADLs and inability to perform at their prior level of function. Pt would benefit from physical therapy to address the impairments found & listed previously in the objective section in order to return to safe and pain-free ADLs and prior level of function.       Plan:     Planned Interventions include: therapeutic exercise, self-care home management, manual therapy, therapeutic activities, gait training, neuromuscular coordination, vasopneumatic, dry needling, aquatic therapy  Frequency: 1 x Week  Duration: 8 Weeks    Current Problem:  1. Chronic pain of both knees  Referral to Physical Therapy    Follow Up In Physical Therapy          Precautions:   Precautions  STEADI Fall Risk Score (The score of 4 or more indicates an increased risk of falling): 3  Precautions Comment: HTN, HLD      Medical History Form: Reviewed (scanned into chart)    Subjective:   Subjective   Chief Complaint: Pt presents to PT with c/o chronic B knee pain beginning 3 years ago, attributes onset to exercise (step aerobics, plyometrics, running) and age. Pt continues to work out, however, modifies everything. Of note, recently received PT at this clinic in January 2024, had to discontinue d/t illness/other circumstances.  Onset: 3 years ago  KRISTEN: Exercise/aging    Current Condition:   Worse    Pain:  Pain Assessment: 0-10  Pain Score: 0 - No  "pain  Location: anterior B knee  Description: sharp, shooting; denies n/t, denies swelling, reports occ pain-free clicking  Aggravating Factors:  stair negotiation (modifies), running, jumping, pain worse in the evening  Relieving Factors:  rest  Pain Intensity: Best - 0/10, Worst - 6/10    Relevant Information (PMH & Previous Tests/Imaging): HBP, HLD, h/o R sciatica for past 6-8 months  B Knee XR: Patellofemoral predominant degenerative changes of the knees (6/2023)   Previous Interventions/Treatments: 1 PT session in January (no longer continuing PT exercises)    Prior Level of Function (PLOF)  Patient previously independent with all ADLs  Exercise/Physical Activity: step aerobics, plyometrics, running; upper body strengthening, modified HIIT work outs (feels strain the next day), walking regularly  Work/School:  [employer]    Patients Living Environment: Reviewed and no concern    Primary Language: English    Patient's Goal(s) for Therapy: \"To alleviate the pain so I can move more freely\"    Patient Specific Functional Scale (0 = unable to perform; 10 = able to perform activity at same level as before injury or problem)  Activity Current Follow Up Discharge   Stair negotiation 5     Plyometrics  0           Total score = sum of the activity scores/number of activities  Minimum detectable change (90%CI) for average score = 2 points  Minimum detectable change (90%CI) for single activity score = 3 points    Red Flags: Do you have any of the following? No  Fever/chills, unexplained weight changes, dizziness/fainting, unexplained change in bowel or bladder functions, unexplained malaise or muscle weakness, night pain/sweats, numbness or tingling    Objective:  Objective     Posture: Rounded shoulders, iliac crest = height    Palpation: Denies TTP    Knee AROM  Flexion R 139 deg; L 140 deg  Extension R 4 deg; L 6 deg    Hip AROM  Grossly WFL, except B hip ER/Ext mildly limited    Lower Extremity " MMT  Flexion R 4/5; L 4+/5  Abduction R 4-/5; L 4-/5  Extension R 4-/5; L 4-/5  ER R 4/5; L 4/5  IR R 4/5; L 4/5  Knee flexion R 4+/5; L 4+/5  Knee extension R 4/5; L 4+/5    SL Heel Raises  R 18 (partial range)   L 10 (partial range)    Gastroc Flexibility: R 2 deg L 10  HS Flexibility: R mildly limited L WFL  RF Flexibility: R moderately limited L moderately limited    Special Tests:  Varus 0 deg R (-); L (-)  Varus 25 deg R (-); L (-)  Valgus 0 deg R (-); L (-)  Valgus 25 deg R (-); L (-)  Patellar Apprehension R (-); L (-)  Patellar Grind R (-); L (-)  Anterior Drawer R (-); L (-)    Gait Analysis: unremarkable  Transfers: unremrable    Balance:   SL Balance: R <4s; L <3s    Joint Mobility: patella hypomobile sup/inf    DL Squat: increased fwd trunk lean (denies pain)    Outcome Measures:  Other Measures  Lower Extremity Funtional Score (LEFS): 61/80     EDUCATION: Home exercise program, plan of care, activity modifications, pain management, and injury pathology       Goals: Set and discussed today  Active       PT Problem       PT Goal 1       Start:  04/05/24    Expected End:  07/04/24       In 4 weeks, patient will improve SL balance to at least 10 seconds on RLE in order to safely ambulate in community without being at a risk for falls.   In 4 weeks, patient will report a decrease in pain to <3/10 in order to tolerate exercise.   In 6 weeks, patient will demonstrate improved LE strength by ½ grade in order to perform reciprocal pattern with stair negotiation.   In 6 weeks, patient will demonstrate improved SL heel raise 15 R/L through full ROM to improve ease exercise,  By discharge, patient will demonstrate improved LEFS by 9.  By discharge, patient will be independent with final HEP.                Plan of care was developed with input and agreement by the patient    Treatment Performed:  HEP: SLR, SL hip abd, clamshell, prone hip ext, heel raises, prone quad stretch, standing gastroc stretch  Access Code:  [email or web address]    Charges:  Evaluation: low complexity  Treatment: 1 TE, 1 self care    Callie Weller, PT

## 2024-04-05 NOTE — PROGRESS NOTES
Physical Therapy  Physical Therapy Treatment    Patient Name: Tere Moreira  MRN: [id number]  Today's Date: 4/5/2024       Insurance:  Visit number: 1 of ?  Authorization info: Auth Needed  Insurance Type: [place]  Cert start date: 4/5/24; Cert end date: 7/3/24      General:  Reason for visit: Chronic pain B knees  Referred by: Dr. Iman Jack    Assessment: ***       Plan: ***       Current Problem  No diagnosis found.    Precautions:        Subjective:  Subjective   ***    Pain       Performing HEP?: {Yes/No:28906}    Objective:  Objective   ***    Treatments:  Exercise:  - Upright bike  - Standing hip abd  - Standing hip ext  - Partial range wall squat   - TKE  - Heel raises  - DBE  - Tandem stance with KB pass  - SL press partial range    HEP: SLR, SL hip abd, clamshell, prone hip ext, heel raises, prone quad stretch, standing gastroc stretch  Access Code: [email or web address]    Callie Weller, PT

## 2024-04-05 NOTE — PROGRESS NOTES
Subjective   Patient ID: Tere Moreira is a 64 y.o. female who presents for Follow-up.  HPI      63 yo AA female with a history of HLP,HTN,  here for the management of:    1- an increased urinary frequency that started about a year ago and did not stopped after the UTI last treated a month ago.  Her A1C was at 5.9% when tested 4-5  months ago.  Unchanged from her baseline.  2- Dexa scan result. It was normal     A review of system was completed.  All systems were reviewed and were normal, except for the ones that are listed in the HPI.    Objective   Physical Exam  Constitutional:       Appearance: Normal appearance.   HENT:      Head: Normocephalic and atraumatic.      Right Ear: Tympanic membrane, ear canal and external ear normal.      Left Ear: Tympanic membrane, ear canal and external ear normal.      Nose: Nose normal.      Mouth/Throat:      Mouth: Mucous membranes are moist.      Pharynx: Oropharynx is clear.   Eyes:      Extraocular Movements: Extraocular movements intact.      Conjunctiva/sclera: Conjunctivae normal.      Pupils: Pupils are equal, round, and reactive to light.   Cardiovascular:      Rate and Rhythm: Normal rate and regular rhythm.      Pulses: Normal pulses.   Pulmonary:      Effort: Pulmonary effort is normal.      Breath sounds: Normal breath sounds.   Abdominal:      General: Abdomen is flat. Bowel sounds are normal.      Palpations: Abdomen is soft.   Musculoskeletal:         General: Normal range of motion.      Cervical back: Normal range of motion and neck supple.   Skin:     General: Skin is warm.   Neurological:      General: No focal deficit present.      Mental Status: She is alert and oriented to person, place, and time. Mental status is at baseline.   Psychiatric:         Mood and Affect: Mood normal.         Behavior: Behavior normal.         Thought Content: Thought content normal.         Judgment: Judgment normal.     Assessment/Plan   Problem List Items Addressed  This Visit       Visit for screening mammogram    Increased urinary frequency - Primary     -UA w/ C+S ordered.  -Detrol LA 4 mg every day started today.  Side effects discussed.          Relevant Medications    tolterodine LA (Detrol LA) 4 mg 24 hr capsule    Other Relevant Orders    POCT UA Automated manually resulted    Urine Culture    BI mammo bilateral screening tomosynthesis      Patient to return to office in 4- 6 weeks.  Sooner if needed.

## 2024-04-06 LAB — BACTERIA UR CULT: NORMAL

## 2024-04-08 RX ORDER — LOSARTAN POTASSIUM AND HYDROCHLOROTHIAZIDE 12.5; 5 MG/1; MG/1
1 TABLET ORAL DAILY
Qty: 90 TABLET | Refills: 1 | Status: SHIPPED | OUTPATIENT
Start: 2024-04-08

## 2024-04-09 ENCOUNTER — APPOINTMENT (OUTPATIENT)
Dept: PHYSICAL THERAPY | Facility: CLINIC | Age: 64
End: 2024-04-09
Payer: COMMERCIAL

## 2024-04-10 NOTE — PROGRESS NOTES
Subjective   HPI: Tere Moreira is a 64 y.o. female new patient who presents in office for evaluation and treatment of 2 moles (one on each leg).  No significant change.  The 1 on the left anterior ankle has maybe grown in size a little bit.  Her PCP recommended seeing Derm for evaluation.    ROS: No other skin or systemic complaints other than what is documented elsewhere in the note.    ALLERGIES: Benzoyl peroxide    SOCIAL:  reports that she has never smoked. She has never used smokeless tobacco. She reports that she does not currently use alcohol. She reports that she does not use drugs.    Objective   Left Ankle - Anterior, Left Thigh - Anterior, Right Thigh - Anterior  Numerous benign appearing symmetrical, regular boarders, evenly pigmented, nevi      Left Lower Leg - Anterior  Roughly 0.7 irregular bordered various pigmented macule              Assessment/Plan   1. Nevus (4)  Left Ankle - Anterior; Left Thigh - Anterior; Right Thigh - Anterior; Left Lower Leg - Anterior    Ddx -nevus versus lentigo    Discussed with patient that the 1 on the left anterior shin is a little bit more concerning.  I recommended shave removal for biopsy.  Patient is going to call her insurance and find out what portion she would owe.  Observation.         FOLLOW UP: Please schedule for biopsy.    The patient was encouraged to contact me with any further questions or concerns.  Cristiane Albarran PA-C  4/22/2024

## 2024-04-11 ENCOUNTER — APPOINTMENT (OUTPATIENT)
Dept: PHYSICAL THERAPY | Facility: CLINIC | Age: 64
End: 2024-04-11
Payer: COMMERCIAL

## 2024-04-15 NOTE — PROGRESS NOTES
Physical Therapy  Physical Therapy Treatment    Patient Name: Tere Moreira  MRN: [id number]  Today's Date: 4/16/2024  Time Calculation  Start Time: 1535  Stop Time: 1600  Time Calculation (min): 25 min    Insurance:  Visit number: 2 of 5  Authorization info: Auth Needed  Insurance Type: [place]  Cert start date: 4/5/24; Cert end date: 6/3/24      General:  Reason for visit: Chronic pain B knees  Referred by: Dr. Iman Jack    Assessment: Notes mild pain near end of dynamic warm up on upright bike. Will try higher seat level NV to require less knee flexion ROM. Demonstrating good performance of HEP and tolerating progressions well. At end of session, pt denies puneet.       Plan: Progress knee ROM/strength and balance to tolerance.       Current Problem  1. Chronic pain of both knees        2. Weakness        3. Primary osteoarthritis of both knees            Precautions:   Precautions  STEADI Fall Risk Score (The score of 4 or more indicates an increased risk of falling): 3  Precautions Comment: HTN, HLD    Subjective:  Subjective   Pt presents to PT reporting recent lumbar strain, thus did not perform exercises as often as she had liked. States no change in symptoms at this time. R knee is more bothersome than L.     Pain  Pain Assessment: 0-10  Pain Score: 0 - No pain    Performing HEP?: Partially    Objective:  Objective   Pain with EOR knee flexion    Treatments:  Exercise:  - Upright bike (seat 3) 3:30 (try seat 4 NV)  - SLR 2x10  - SL hip abd 2x10  - Clam 2x10  - Prone hip ext 2x10  - SS RTB x1 lap  - MW RTB x1 lap   - Heel raises 2x15  - DBE 2' ea     HEP: SLR, SL hip abd, clamshell, prone hip ext, heel raises, prone quad stretch, standing gastroc stretch  Access Code: [email or web address]    Callie Weller, PT

## 2024-04-16 ENCOUNTER — TREATMENT (OUTPATIENT)
Dept: PHYSICAL THERAPY | Facility: CLINIC | Age: 64
End: 2024-04-16
Payer: COMMERCIAL

## 2024-04-16 DIAGNOSIS — M17.0 PRIMARY OSTEOARTHRITIS OF BOTH KNEES: ICD-10-CM

## 2024-04-16 DIAGNOSIS — M25.561 CHRONIC PAIN OF BOTH KNEES: Primary | ICD-10-CM

## 2024-04-16 DIAGNOSIS — M25.562 CHRONIC PAIN OF BOTH KNEES: Primary | ICD-10-CM

## 2024-04-16 DIAGNOSIS — G89.29 CHRONIC PAIN OF BOTH KNEES: Primary | ICD-10-CM

## 2024-04-16 DIAGNOSIS — R53.1 WEAKNESS: ICD-10-CM

## 2024-04-16 PROCEDURE — 97112 NEUROMUSCULAR REEDUCATION: CPT | Mod: GP | Performed by: PHYSICAL THERAPIST

## 2024-04-16 PROCEDURE — 97110 THERAPEUTIC EXERCISES: CPT | Mod: GP | Performed by: PHYSICAL THERAPIST

## 2024-04-16 ASSESSMENT — PAIN - FUNCTIONAL ASSESSMENT: PAIN_FUNCTIONAL_ASSESSMENT: 0-10

## 2024-04-16 ASSESSMENT — PAIN SCALES - GENERAL: PAINLEVEL_OUTOF10: 0 - NO PAIN

## 2024-04-22 ENCOUNTER — OFFICE VISIT (OUTPATIENT)
Dept: DERMATOLOGY | Facility: CLINIC | Age: 64
End: 2024-04-22
Payer: COMMERCIAL

## 2024-04-22 DIAGNOSIS — D22.9 NEVUS: ICD-10-CM

## 2024-04-22 PROCEDURE — 99213 OFFICE O/P EST LOW 20 MIN: CPT

## 2024-04-22 PROCEDURE — 1159F MED LIST DOCD IN RCRD: CPT

## 2024-04-22 PROCEDURE — 11301 SHAVE SKIN LESION 0.6-1.0 CM: CPT

## 2024-04-23 ENCOUNTER — TREATMENT (OUTPATIENT)
Dept: PHYSICAL THERAPY | Facility: CLINIC | Age: 64
End: 2024-04-23
Payer: COMMERCIAL

## 2024-04-23 DIAGNOSIS — M25.561 CHRONIC PAIN OF BOTH KNEES: Primary | ICD-10-CM

## 2024-04-23 DIAGNOSIS — R53.1 WEAKNESS: ICD-10-CM

## 2024-04-23 DIAGNOSIS — M25.562 CHRONIC PAIN OF BOTH KNEES: Primary | ICD-10-CM

## 2024-04-23 DIAGNOSIS — G89.29 CHRONIC PAIN OF BOTH KNEES: Primary | ICD-10-CM

## 2024-04-23 DIAGNOSIS — M17.0 PRIMARY OSTEOARTHRITIS OF BOTH KNEES: ICD-10-CM

## 2024-04-23 PROCEDURE — 97110 THERAPEUTIC EXERCISES: CPT | Mod: GP,CQ

## 2024-04-23 ASSESSMENT — PAIN - FUNCTIONAL ASSESSMENT: PAIN_FUNCTIONAL_ASSESSMENT: 0-10

## 2024-04-23 ASSESSMENT — PAIN SCALES - GENERAL: PAINLEVEL_OUTOF10: 0 - NO PAIN

## 2024-04-23 NOTE — PROGRESS NOTES
Physical Therapy  Physical Therapy Treatment    Patient Name: Tere Moreira  MRN: [id number]  Today's Date: 4/23/2024    Time Calculation  Start Time: 1458  Stop Time: 1538  Time Calculation (min): 40 min    Insurance:  Visit number: 3 of 5  Authorization info: Auth Needed  Insurance Type: [place]  Cert start date: 4/5/24; Cert end date: 6/3/24     Current Problem  1. Chronic pain of both knees        2. Weakness        3. Primary osteoarthritis of both knees            Precautions  Precautions  STEADI Fall Risk Score (The score of 4 or more indicates an increased risk of falling): 3  Precautions Comment: HTN, HLD    Pain  Pain Assessment: 0-10  Pain Score: 0 - No pain    Subjective:   Subjective   Patient reports really enjoying the hip abduction machine post use and that they want to use it in next visit.     HEP Compliance: fair    Objective:   Objective   Trunk flexion compensation during hamstring stretch on stairs.    Treatments:     Exercise Sets/Reps Comments   Upright bike  5'  Seat level 4    Calve stretch  1'  Rocker board   Hamstring stretch 1' Each leg   Stair negotiation 15 steps Cues for heel drive.   Side steps red band  3 laps in parallel bars    Bridges with marches  x10    Bridges SL  x10 Each leg   Toe raises back against wall x30    Heel taps on stairs with airex pad  x20 Each way   Cybex seated hip abduction 3x10 47.5# first two sets 55#   Charges: TE x3    Assessment:    Pt reported that stair ambulation was more tolerable but still not pain free post heel drive cueing to increase hip extensor activation and take pressure off of the knee.     Plan:    Continue to progress hip abductor and glute extensor strengthening in upcoming visit.     Cole Hurtado, PTA

## 2024-04-30 NOTE — PROGRESS NOTES
Physical Therapy  Physical Therapy Treatment    Patient Name: Tere Moreira  MRN: [id number]  Today's Date: 4/30/2024         Insurance:  Visit number: 4 of 5  Authorization info: Auth Needed  Insurance Type: [place]  Cert start date: 4/5/24; Cert end date: 6/3/24     General:  Reason for visit: Chronic pain B knees  Referred by: Dr. Iman Jack    Current Problem  No diagnosis found.      Precautions       Pain       Subjective:   Subjective   Patient reports really enjoying the hip abduction machine post use and that they want to use it in next visit.     HEP Compliance: fair    Objective:   Objective   Trunk flexion compensation during hamstring stretch on stairs.    Treatments:     Exercise Sets/Reps Comments   Upright bike  5'  Seat level 4    Calve stretch  1'  Rocker board   Hamstring stretch 1' Each leg   Stair negotiation 15 steps Cues for heel drive.   Side steps red band  3 laps in parallel bars    Bridges with marches  x10    Bridges SL  x10 Each leg   Toe raises back against wall x30    Heel taps on stairs with airex pad  x20 Each way   Cybex seated hip abduction 3x10 47.5# first two sets 55#   Charges: TE x3    Assessment:    Pt reported that stair ambulation was more tolerable but still not pain free post heel drive cueing to increase hip extensor activation and take pressure off of the knee.     Plan:    Continue to progress hip abductor and glute extensor strengthening in upcoming visit.     Callie Weller, PT

## 2024-05-01 ENCOUNTER — APPOINTMENT (OUTPATIENT)
Dept: PHYSICAL THERAPY | Facility: CLINIC | Age: 64
End: 2024-05-01
Payer: COMMERCIAL

## 2024-05-01 ENCOUNTER — TREATMENT (OUTPATIENT)
Dept: PHYSICAL THERAPY | Facility: CLINIC | Age: 64
End: 2024-05-01
Payer: COMMERCIAL

## 2024-05-01 DIAGNOSIS — M25.562 CHRONIC PAIN OF BOTH KNEES: ICD-10-CM

## 2024-05-01 DIAGNOSIS — G89.29 CHRONIC PAIN OF BOTH KNEES: ICD-10-CM

## 2024-05-01 DIAGNOSIS — M25.561 CHRONIC PAIN OF BOTH KNEES: ICD-10-CM

## 2024-05-01 PROCEDURE — 97110 THERAPEUTIC EXERCISES: CPT | Mod: GP | Performed by: PHYSICAL THERAPIST

## 2024-05-01 ASSESSMENT — PAIN SCALES - GENERAL: PAINLEVEL_OUTOF10: 0 - NO PAIN

## 2024-05-01 ASSESSMENT — PAIN - FUNCTIONAL ASSESSMENT: PAIN_FUNCTIONAL_ASSESSMENT: 0-10

## 2024-05-01 NOTE — PROGRESS NOTES
"Physical Therapy  Physical Therapy Treatment    Patient Name: Tere Moreira  MRN: [id number]  Today's Date: 5/1/2024    Time Calculation  Start Time: 1703  Stop Time: 1748  Time Calculation (min): 45 min    Insurance:  Visit number: 4 of 5  Authorization info: Auth Needed  Insurance Type: [place]  Cert start date: 4/5/24; Cert end date: 6/3/24     General:  Reason for visit: Chronic pain B knees  Referred by: Dr. Iman Jack    Current Problem  1. Chronic pain of both knees  Follow Up In Physical Therapy            Precautions  Precautions  STEADI Fall Risk Score (The score of 4 or more indicates an increased risk of falling): 3  Precautions Comment: HTN, HLD    Pain  Pain Assessment: 0-10  Pain Score: 0 - No pain    Subjective:   Subjective   Patient presents to PT reporting HEP adherence, noting feeling good afterward. States she had to negotiate 12 flights of stairs this date with increase in knee pain. However, notes improvement when pushing weight through heel while ascending stairs.     HEP Compliance: Good    Objective:   Objective   Pain with wall squat    Treatments:   Exercise:  - Upright bike 5' L4  - Standing hip abduction 33# 2x10  - Heel raises 2x15  - Standing DF 2x15  - SS RTB x2 laps  - MW RTB x2 laps  - HS curl on PB 2x10  - DBE 2'  - Iso knee ext 60 deg 10x10\" holds    Assessment:    Attempting wall squat through pain-free ROM, however, pt unable to perform without B knee pain thus discontinuing. Tolerating remaining exercises at well, noting challenge. At end of session, pt denies pain.    Plan:    Continue to progress hip abductor and extensor strengthening in upcoming visits. Recheck NV, pt will benefit from continued visits.      Callie Weller, PT    "

## 2024-05-06 ENCOUNTER — APPOINTMENT (OUTPATIENT)
Dept: PRIMARY CARE | Facility: CLINIC | Age: 64
End: 2024-05-06
Payer: COMMERCIAL

## 2024-05-06 DIAGNOSIS — E78.5 HYPERLIPIDEMIA, UNSPECIFIED: ICD-10-CM

## 2024-05-06 RX ORDER — EZETIMIBE 10 MG/1
10 TABLET ORAL DAILY
Qty: 90 TABLET | Refills: 1 | Status: SHIPPED | OUTPATIENT
Start: 2024-05-06

## 2024-05-06 NOTE — PROGRESS NOTES
Physical Therapy  Physical Therapy Orthopedic Progress Note    Patient Name: Tere Moreira  MRN: [id number]  Today's Date: 5/6/2024       Insurance:  Visit number: 5 of 5  Authorization info: Auth Needed  Insurance Type: [place]  Cert start date: 4/5/24; Cert end date: 6/3/24      General:  Reason for visit: Chronic pain B knees  Referred by: Dr. Iman Jack    Assessment: ***       Plan: Updated 5/6/2024     Planned Interventions include: therapeutic exercise, self-care home management, manual therapy, therapeutic activities, gait training, neuromuscular coordination, vasopneumatic, dry needling, aquatic therapy  Frequency: {Frequency:60711}  Duration: {Duration:64318}    Current Problem  No diagnosis found.    Precautions: ***         Subjective:  Subjective   Patient reports ***    Current Condition:   {Current Condition:57959}    Pain:     Location: ***  Description: ***  Aggravating Factors: {Aggravating Factors:00588}  Relieving Factors:  {Relieving Factors:41796}    Self Reported Function (0-100%) = ***  - 100% being back to PLOF    Objective:  Objective   Posture: Rounded shoulders, iliac crest = height     Palpation: Denies TTP     Knee AROM  Flexion R 139 deg; L 140 deg  Extension R 4 deg; L 6 deg     Hip AROM  Grossly WFL, except B hip ER/Ext mildly limited     Lower Extremity MMT  Flexion R 4/5; L 4+/5  Abduction R 4-/5; L 4-/5  Extension R 4-/5; L 4-/5  ER R 4/5; L 4/5  IR R 4/5; L 4/5  Knee flexion R 4+/5; L 4+/5  Knee extension R 4/5; L 4+/5     SL Heel Raises  R 18 (partial range)   L 10 (partial range)     Gastroc Flexibility: R 2 deg L 10  HS Flexibility: R mildly limited L WFL  RF Flexibility: R moderately limited L moderately limited     Special Tests:  Varus 0 deg R (-); L (-)  Varus 25 deg R (-); L (-)  Valgus 0 deg R (-); L (-)  Valgus 25 deg R (-); L (-)  Patellar Apprehension R (-); L (-)  Patellar Grind R (-); L (-)  Anterior Drawer R (-); L (-)     Gait Analysis:  "unremarkable  Transfers: unremrable     Balance:   SL Balance: R <4s; L <3s    Joint Mobility: patella hypomobile sup/inf     DL Squat: increased fwd trunk lean (denies pain)  Outcome Measures: Updated 5/6/2024  {PT Outcome Measures:85384}     Patient Specific Functional Scale (0 = unable to perform; 10 = able to perform activity at same level as before injury or problem)  Activity Current Follow Up Discharge   Stair negotiation 5       Plyometrics  0                 Total score = sum of the activity scores/number of activities  Minimum detectable change (90%CI) for average score = 2 points  Minimum detectable change (90%CI) for single activity score = 3 points    EDUCATION: home exercise program, plan of care, activity modifications, pain management, and injury pathology       Goals: Updated 5/6/2024  Active       PT Problem       PT Goal 1       Start:  04/05/24    Expected End:  07/04/24       In 4 weeks, patient will improve SL balance to at least 10 seconds on RLE in order to safely ambulate in community without being at a risk for falls.   In 4 weeks, patient will report a decrease in pain to <3/10 in order to tolerate exercise.   In 6 weeks, patient will demonstrate improved LE strength by ½ grade in order to perform reciprocal pattern with stair negotiation.   In 6 weeks, patient will demonstrate improved SL heel raise 15 R/L through full ROM to improve ease exercise,  By discharge, patient will demonstrate improved LEFS by 9.  By discharge, patient will be independent with final HEP.                Treatments:   Exercise:  - Upright bike 5' L4  - Standing hip abduction 33# 2x10  - Heel raises 2x15  - Standing DF 2x15  - SS RTB x2 laps  - MW RTB x2 laps  - HS curl on PB 2x10  - DBE 2'  - Iso knee ext 60 deg 10x10\" holds    HEP Access Code:     Callie Weller, PT  "

## 2024-05-07 ENCOUNTER — APPOINTMENT (OUTPATIENT)
Dept: PHYSICAL THERAPY | Facility: CLINIC | Age: 64
End: 2024-05-07
Payer: COMMERCIAL

## 2024-05-07 DIAGNOSIS — R73.03 PREDIABETES: ICD-10-CM

## 2024-05-07 RX ORDER — ATORVASTATIN CALCIUM 40 MG/1
40 TABLET, FILM COATED ORAL NIGHTLY
Qty: 90 TABLET | Refills: 1 | Status: SHIPPED | OUTPATIENT
Start: 2024-05-07

## 2024-05-09 ENCOUNTER — OFFICE VISIT (OUTPATIENT)
Dept: PRIMARY CARE | Facility: CLINIC | Age: 64
End: 2024-05-09
Payer: COMMERCIAL

## 2024-05-09 VITALS
DIASTOLIC BLOOD PRESSURE: 81 MMHG | BODY MASS INDEX: 22.71 KG/M2 | SYSTOLIC BLOOD PRESSURE: 116 MMHG | HEART RATE: 103 BPM | TEMPERATURE: 96.8 F | WEIGHT: 145 LBS

## 2024-05-09 DIAGNOSIS — R35.0 INCREASED URINARY FREQUENCY: Primary | ICD-10-CM

## 2024-05-09 LAB
POC APPEARANCE, URINE: ABNORMAL
POC BILIRUBIN, URINE: NEGATIVE
POC BLOOD, URINE: ABNORMAL
POC COLOR, URINE: YELLOW
POC GLUCOSE, URINE: NEGATIVE MG/DL
POC KETONES, URINE: NEGATIVE MG/DL
POC LEUKOCYTES, URINE: ABNORMAL
POC NITRITE,URINE: NEGATIVE
POC PH, URINE: 5.5 PH
POC PROTEIN, URINE: ABNORMAL MG/DL
POC SPECIFIC GRAVITY, URINE: 1.02
POC UROBILINOGEN, URINE: 0.2 EU/DL

## 2024-05-09 PROCEDURE — 3079F DIAST BP 80-89 MM HG: CPT | Performed by: FAMILY MEDICINE

## 2024-05-09 PROCEDURE — 1036F TOBACCO NON-USER: CPT | Performed by: FAMILY MEDICINE

## 2024-05-09 PROCEDURE — 81003 URINALYSIS AUTO W/O SCOPE: CPT | Performed by: FAMILY MEDICINE

## 2024-05-09 PROCEDURE — 87186 SC STD MICRODIL/AGAR DIL: CPT

## 2024-05-09 PROCEDURE — 1126F AMNT PAIN NOTED NONE PRSNT: CPT | Performed by: FAMILY MEDICINE

## 2024-05-09 PROCEDURE — 1159F MED LIST DOCD IN RCRD: CPT | Performed by: FAMILY MEDICINE

## 2024-05-09 PROCEDURE — 3074F SYST BP LT 130 MM HG: CPT | Performed by: FAMILY MEDICINE

## 2024-05-09 PROCEDURE — 87086 URINE CULTURE/COLONY COUNT: CPT

## 2024-05-09 PROCEDURE — 99214 OFFICE O/P EST MOD 30 MIN: CPT | Performed by: FAMILY MEDICINE

## 2024-05-09 RX ORDER — NITROFURANTOIN 25; 75 MG/1; MG/1
100 CAPSULE ORAL 2 TIMES DAILY
Qty: 20 CAPSULE | Refills: 0 | Status: SHIPPED | OUTPATIENT
Start: 2024-05-09 | End: 2024-05-19

## 2024-05-09 ASSESSMENT — PAIN SCALES - GENERAL: PAINLEVEL: 0-NO PAIN

## 2024-05-09 NOTE — PROGRESS NOTES
Subjective   Patient ID: Tere Moreira is a 64 y.o. female who presents for Follow-up (SYMPTOMS of being diabetic ).  HPI    63 yo AA female with a history of HLP,HTN,  here for the management of an increased urinary frequency, chills, fatigue, foul smelling urine for the past week.   A review of system was completed.  All systems were reviewed and were normal, except for the ones that are listed in the HPI.    Objective   Physical Exam  Constitutional:       Appearance: Normal appearance.   HENT:      Head: Normocephalic and atraumatic.      Right Ear: Tympanic membrane, ear canal and external ear normal.      Left Ear: Tympanic membrane, ear canal and external ear normal.      Nose: Nose normal.      Mouth/Throat:      Mouth: Mucous membranes are moist.      Pharynx: Oropharynx is clear.   Eyes:      Extraocular Movements: Extraocular movements intact.      Conjunctiva/sclera: Conjunctivae normal.      Pupils: Pupils are equal, round, and reactive to light.   Cardiovascular:      Rate and Rhythm: Normal rate and regular rhythm.      Pulses: Normal pulses.   Pulmonary:      Effort: Pulmonary effort is normal.      Breath sounds: Normal breath sounds.   Abdominal:      General: Abdomen is flat. Bowel sounds are normal.      Palpations: Abdomen is soft.   Musculoskeletal:         General: Normal range of motion.      Cervical back: Normal range of motion and neck supple.   Skin:     General: Skin is warm.   Neurological:      General: No focal deficit present.      Mental Status: She is alert and oriented to person, place, and time. Mental status is at baseline.   Psychiatric:         Mood and Affect: Mood normal.         Behavior: Behavior normal.         Thought Content: Thought content normal.         Judgment: Judgment normal.     Assessment/Plan   Problem List Items Addressed This Visit       Increased urinary frequency - Primary     -New onset of suspected UTI.  -Nitrofurantoin 100 mg BID for 10 days  started.          Relevant Medications    nitrofurantoin, macrocrystal-monohydrate, (Macrobid) 100 mg capsule    Other Relevant Orders    POCT UA Automated manually resulted    Urine Culture      Patient to return to office in 1 week if not better.

## 2024-05-11 LAB — BACTERIA UR CULT: ABNORMAL

## 2024-05-13 ENCOUNTER — OFFICE VISIT (OUTPATIENT)
Dept: DERMATOLOGY | Facility: CLINIC | Age: 64
End: 2024-05-13
Payer: COMMERCIAL

## 2024-05-13 DIAGNOSIS — D22.9 ATYPICAL NEVUS: ICD-10-CM

## 2024-05-13 PROCEDURE — 1159F MED LIST DOCD IN RCRD: CPT

## 2024-05-13 PROCEDURE — 11301 SHAVE SKIN LESION 0.6-1.0 CM: CPT

## 2024-05-13 PROCEDURE — 99213 OFFICE O/P EST LOW 20 MIN: CPT

## 2024-05-15 LAB
LABORATORY COMMENT REPORT: NORMAL
PATH REPORT.FINAL DX SPEC: NORMAL
PATH REPORT.GROSS SPEC: NORMAL
PATH REPORT.MICROSCOPIC SPEC OTHER STN: NORMAL
PATH REPORT.RELEVANT HX SPEC: NORMAL
PATH REPORT.TOTAL CANCER: NORMAL

## 2024-06-05 NOTE — PROGRESS NOTES
Physical Therapy  Physical Therapy Orthopedic Progress Note    Patient Name: Tere Moreira  MRN: [id number]  Today's Date: 6/5/2024       Insurance:  Visit number: 5 of 5  Authorization info: Auth Needed  Insurance Type: [place]  Cert start date: 4/5/24; Cert end date: 6/3/24      General:  Reason for visit: Chronic pain B knees  Referred by: Dr. Iman Jack    Assessment: ***       Plan: Updated 6/5/2024     Planned Interventions include: therapeutic exercise, self-care home management, manual therapy, therapeutic activities, gait training, neuromuscular coordination, vasopneumatic, dry needling, aquatic therapy  Frequency: {Frequency:33849}  Duration: {Duration:07150}    Current Problem  No diagnosis found.    Precautions: ***         Subjective:  Subjective   Patient reports ***    Current Condition:   {Current Condition:78890}    Pain:     Location: ***  Description: ***  Aggravating Factors: {Aggravating Factors:49974}  Relieving Factors:  {Relieving Factors:50178}    Self Reported Function (0-100%) = ***  - 100% being back to PLOF    Objective:  Objective   Posture: Rounded shoulders, iliac crest = height     Palpation: Denies TTP     Knee AROM  Flexion R 139 deg; L 140 deg  Extension R 4 deg; L 6 deg     Hip AROM  Grossly WFL, except B hip ER/Ext mildly limited     Lower Extremity MMT  Flexion R 4/5; L 4+/5  Abduction R 4-/5; L 4-/5  Extension R 4-/5; L 4-/5  ER R 4/5; L 4/5  IR R 4/5; L 4/5  Knee flexion R 4+/5; L 4+/5  Knee extension R 4/5; L 4+/5     SL Heel Raises  R 18 (partial range)   L 10 (partial range)     Gastroc Flexibility: R 2 deg L 10  HS Flexibility: R mildly limited L WFL  RF Flexibility: R moderately limited L moderately limited     Special Tests:  Varus 0 deg R (-); L (-)  Varus 25 deg R (-); L (-)  Valgus 0 deg R (-); L (-)  Valgus 25 deg R (-); L (-)  Patellar Apprehension R (-); L (-)  Patellar Grind R (-); L (-)  Anterior Drawer R (-); L (-)     Gait Analysis:  "unremarkable  Transfers: unremrable     Balance:   SL Balance: R <4s; L <3s    Joint Mobility: patella hypomobile sup/inf     DL Squat: increased fwd trunk lean (denies pain)  Outcome Measures: Updated 6/5/2024  {PT Outcome Measures:06039}     Patient Specific Functional Scale (0 = unable to perform; 10 = able to perform activity at same level as before injury or problem)  Activity Current Follow Up Discharge   Stair negotiation 5       Plyometrics  0                 Total score = sum of the activity scores/number of activities  Minimum detectable change (90%CI) for average score = 2 points  Minimum detectable change (90%CI) for single activity score = 3 points    EDUCATION: home exercise program, plan of care, activity modifications, pain management, and injury pathology       Goals: Updated 6/5/2024  Active       PT Problem       PT Goal 1       Start:  04/05/24    Expected End:  07/04/24       In 4 weeks, patient will improve SL balance to at least 10 seconds on RLE in order to safely ambulate in community without being at a risk for falls.   In 4 weeks, patient will report a decrease in pain to <3/10 in order to tolerate exercise.   In 6 weeks, patient will demonstrate improved LE strength by ½ grade in order to perform reciprocal pattern with stair negotiation.   In 6 weeks, patient will demonstrate improved SL heel raise 15 R/L through full ROM to improve ease exercise,  By discharge, patient will demonstrate improved LEFS by 9.  By discharge, patient will be independent with final HEP.                Treatments:   Exercise:  - Upright bike 5' L4  - Standing hip abduction 33# 2x10  - Heel raises 2x15  - Standing DF 2x15  - SS RTB x2 laps  - MW RTB x2 laps  - HS curl on PB 2x10  - DBE 2'  - Iso knee ext 60 deg 10x10\" holds    HEP Access Code:     Callie Weller, PT  "

## 2024-06-10 ENCOUNTER — DOCUMENTATION (OUTPATIENT)
Dept: PHYSICAL THERAPY | Facility: CLINIC | Age: 64
End: 2024-06-10
Payer: COMMERCIAL

## 2024-06-10 ENCOUNTER — APPOINTMENT (OUTPATIENT)
Dept: PHYSICAL THERAPY | Facility: CLINIC | Age: 64
End: 2024-06-10
Payer: COMMERCIAL

## 2024-06-10 NOTE — PROGRESS NOTES
Therapy Communication Note    Patient Name: Tere Moreira  MRN: [id number]  Today's Date: 6/10/2024     Discipline: Physical Therapy    Missed Visit Reason:  Cannot make appointment    Missed Time: Cancel    Comment: 2nd CX of PT POC

## 2024-06-11 NOTE — PROGRESS NOTES
Physical Therapy  Physical Therapy Orthopedic Discharge Note    Patient Name: Tere Moreira  MRN: [id number]  Today's Date: 6/12/2024  Time Calculation  Start Time: 1330  Stop Time: 1400  Time Calculation (min): 30 min    Insurance:  Visit number: 5 of 5  Authorization info: Auth Needed  Insurance Type: [place]  Cert start date: 4/5/24; Cert end date: 6/3/24      General:  Reason for visit: Chronic pain B knees  Referred by: Dr. Iman Jack    Assessment: Pt demonstrating some progress with PT POC. Notes she can manage symptoms well through activity modification. Demonstrates improved strength and flexibility. No changes in PSFS or LEFS. Continues to demonstrate hip extensor/abductor strength deficits and and quad flexibility limitations. Pt demonstrates good understanding of HEP, thus appropriate to discharge to independent exercise       Plan: Updated 6/12/2024     Planned Interventions include: therapeutic exercise, self-care home management, manual therapy, therapeutic activities, gait training, neuromuscular coordination, vasopneumatic, dry needling, aquatic therapy    Discharge to [facility]    Current Problem  1. Chronic pain of both knees        2. Weakness        3. Primary osteoarthritis of both knees            Precautions:   Precautions  STEADI Fall Risk Score (The score of 4 or more indicates an increased risk of falling): 3  Precautions Comment: HTN, HLD      Subjective:  Subjective   Patient reports some improvement with PT POC. Notes she continues to modify activities, such as avoiding jumping and squatting. States she feels better after performing HEP, has been doing exercises 2x/week.    Current Condition:   Better    Pain:  Pain Assessment: 0-10  Pain Score: 0 - No pain  Location: B anterior knees  Description: Sharp  Aggravating Factors: ascending stairs, squatting, jumping  Relieving Factors:  activity modification  Pain Intensity: 0/10 - 5/10    Self Reported Function (0-100%) = 70%  -  100% being back to PLOF    Objective:  Objective      Knee AROM  Flexion R 139 deg; L 140 deg  Extension R 4 deg; L 6 deg     Hip AROM  Grossly WFL, except B hip ER/Ext mildly limited     Lower Extremity MMT  Flexion R 5/5; L 5+/5  Abduction R 4/5; L 4+/5  Extension R 4+/5; L 4/5  ER R 4/5; L 4/5  IR R 4/5; L 4/5  Knee flexion R 5/5; L 5/5  Knee extension R 5/5; L 5/5     SL Heel Raises  R 14 (partial range)   L 14 (partial range)     Gastroc Flexibility: R 9 deg L 12  RF Flexibility: R moderately limited L moderately limited     Balance:   SL Balance: R <20s; L <6s    Joint Mobility: patella hypomobile sup/inf     DL Squat: increased fwd trunk lean (denies pain)  Outcome Measures: Updated 6/12/2024  Other Measures  Lower Extremity Funtional Score (LEFS): 57/80     Patient Specific Functional Scale (0 = unable to perform; 10 = able to perform activity at same level as before injury or problem)  Activity Current Follow Up Discharge   Stair negotiation 5    5   Plyometrics  0    0             Total score = sum of the activity scores/number of activities  Minimum detectable change (90%CI) for average score = 2 points  Minimum detectable change (90%CI) for single activity score = 3 points    EDUCATION: home exercise program, plan of care, activity modifications, pain management, and injury pathology       Goals: Updated 6/12/2024  Resolved       PT Problem       PT Goal 1 (Adequate for Discharge)       Start:  04/05/24    Expected End:  07/04/24       In 4 weeks, patient will improve SL balance to at least 10 seconds on RLE in order to safely ambulate in community without being at a risk for falls. - PARTIALLY MET  In 4 weeks, patient will report a decrease in pain to <3/10 in order to tolerate exercise. - PARTIALLY MET  In 6 weeks, patient will demonstrate improved LE strength by ½ grade in order to perform reciprocal pattern with stair negotiation. - PARTIALLY MET  In 6 weeks, patient will demonstrate improved SL heel  raise 15 R/L through full ROM to improve ease with exercise - PARTIALLY MET  By discharge, patient will demonstrate improved LEFS by 9. - NOT MET  By discharge, patient will be independent with final HEP. - PARTIALLY MET               Treatments:   Exercise:  - Upright bike 6' L4  - Updated and reviewed HEP    HEP Access Code: [email or web address]     Callie Weller, PT

## 2024-06-12 ENCOUNTER — TREATMENT (OUTPATIENT)
Dept: PHYSICAL THERAPY | Facility: CLINIC | Age: 64
End: 2024-06-12
Payer: COMMERCIAL

## 2024-06-12 DIAGNOSIS — M25.562 CHRONIC PAIN OF BOTH KNEES: Primary | ICD-10-CM

## 2024-06-12 DIAGNOSIS — R53.1 WEAKNESS: ICD-10-CM

## 2024-06-12 DIAGNOSIS — M17.0 PRIMARY OSTEOARTHRITIS OF BOTH KNEES: ICD-10-CM

## 2024-06-12 DIAGNOSIS — G89.29 CHRONIC PAIN OF BOTH KNEES: Primary | ICD-10-CM

## 2024-06-12 DIAGNOSIS — M25.561 CHRONIC PAIN OF BOTH KNEES: Primary | ICD-10-CM

## 2024-06-12 PROCEDURE — 97110 THERAPEUTIC EXERCISES: CPT | Mod: GP | Performed by: PHYSICAL THERAPIST

## 2024-06-12 ASSESSMENT — PAIN SCALES - GENERAL: PAINLEVEL_OUTOF10: 0 - NO PAIN

## 2024-06-12 ASSESSMENT — PAIN - FUNCTIONAL ASSESSMENT: PAIN_FUNCTIONAL_ASSESSMENT: 0-10

## 2024-06-28 ENCOUNTER — APPOINTMENT (OUTPATIENT)
Dept: DERMATOLOGY | Facility: CLINIC | Age: 64
End: 2024-06-28
Payer: COMMERCIAL

## 2024-06-28 DIAGNOSIS — L57.8 DIFFUSE PHOTOAGING OF SKIN: ICD-10-CM

## 2024-06-28 DIAGNOSIS — D22.9 MULTIPLE BENIGN NEVI: ICD-10-CM

## 2024-06-28 DIAGNOSIS — D22.9 NEVUS: ICD-10-CM
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
## 2024-06-28 DIAGNOSIS — L81.4 LENTIGO: ICD-10-CM

## 2024-06-28 DIAGNOSIS — L85.3 XEROSIS CUTIS: ICD-10-CM

## 2024-06-28 DIAGNOSIS — L84 CORNS AND CALLOSITIES: ICD-10-CM

## 2024-06-28 RX ORDER — BETAMETHASONE VALERATE 1 MG/G
CREAM TOPICAL 2 TIMES DAILY
Qty: 45 G | Refills: 0 | Status: SHIPPED | OUTPATIENT
Start: 2024-06-28

## 2024-06-28 NOTE — PROGRESS NOTES
Subjective   HPI: Tere Moreira is a 64 y.o. female who presents in office for a full body skin examination.     ROS: No other skin or systemic complaints other than what is documented elsewhere in the note.    ALLERGIES: Benzoyl peroxide    SOCIAL:  reports that she has never smoked. She has never used smokeless tobacco. She reports that she does not currently use alcohol. She reports that she does not use drugs.      Objective   A chaperone was present during the entirety of the examination.    Well appearing patient in no apparent distress; mood and affect are within normal limits.    A full examination was performed including scalp, head, eyes, ears, nose, lips, mouth, tongue, neck, chest, axillae, abdomen, back, buttocks, bilateral upper extremities, bilateral lower extremities, hands, feet, fingers, toes, fingernails, and toenails. Cervical, supraclavicular, axillary and inguinal lymph nodes were palpated. All findings within normal limits unless otherwise noted below.    Numerous benign appearing symmetrical, regular boarders, evenly pigmented, nevi    Spotty depigmentation and hyperpigmentation, and spotty hyperkeratosis with solar lentigo's in light exposed areas     Right Thigh - Posterior  Erythematous lichenified patch    Left 5th Distal Dorsal Toe  Hyperkeratotic plaque with central core       Left Knee - Anterior; Left Ankle - Anterior; Left Lower Leg - Anterior                  Left 3rd Metatarsal Plantar Area, Left Lateral Plantar Surface, Left Medial Plantar Surface (2)  Numerous benign appearing symmetrical, regular boarders, evenly pigmented, nevi        Assessment/Plan   1. Bug bite without infection, initial encounter  Right Thigh - Posterior    BMV bid x14 days.    Related Medications  betamethasone valerate (Valisone) 0.1 % cream  Apply topically 2 times a day. Apply topically to affected area on right posterior thigh bid. DO NOT USE ON FACE OR GROIN.    2. Corns and callosities  Left  5th Distal Dorsal Toe    Discussed nature of the corns with patient including chronic friction/rubbing with bony changes in feet/toes. I recommend off-loading pressure whenever possible and sensible foot wear that does not pinch or rub areas of the foot. OTC tx options include SA 40% preparations such as Dr. Jorge's [place] Removers.     3. Multiple benign nevi    Discussed the need for annual body examination. The patient was advised to practice sun protection and sun avoidance, use daily sunscreen, and perform regular self skin exams.  Sun protection was discussed, including avoiding the mid-day sun, wearing a sunscreen with SPF at least 60, and stressing the need for reapplication of sunscreen and applying more than they think they need.     Discussed the ABCDEs of melanoma and recommended patient observe moles for any changes.  Patient verbalizes understanding.    4. Lentigo (3)  Left Knee - Anterior; Left Ankle - Anterior; Left Lower Leg - Anterior    Observation.    5. Xerosis cutis    Dry skin is common, can be worsened by climate (dry climate makes worse), harsh soaps, and lack of moisturizing. It may worsen as we age. I recommend a gentle skin care regimen including washing with a mild soap without fragrance such as dove for sensitive skin, cetaphil or cerave. Pat dry after washing and then apply a thick moisturizer such as Cerave cream.    6. Diffuse photoaging of skin    Discussed the need for annual body examination. The patient was advised to practice sun protection and sun avoidance, use daily sunscreen, and perform regular self skin exams.  Sun protection was discussed, including avoiding the mid-day sun, wearing a sunscreen with SPF at least 60 that is broad spectrum and water resistant, and stressing the need for reapplication of sunscreen and applying more than they think they need.     7. Nevus (4)  Left 3rd Metatarsal Plantar Area; Left Lateral Plantar Surface; Left Medial Plantar Surface  (2)    observation         FOLLOW UP: 1 year fbse    The patient was encouraged to contact me with any further questions or concerns.  Cristiane Albarran PA-C  7/4/2024   Name band;

## 2024-08-02 ENCOUNTER — APPOINTMENT (OUTPATIENT)
Dept: PRIMARY CARE | Facility: CLINIC | Age: 64
End: 2024-08-02
Payer: COMMERCIAL

## 2024-08-02 VITALS
SYSTOLIC BLOOD PRESSURE: 146 MMHG | HEART RATE: 64 BPM | DIASTOLIC BLOOD PRESSURE: 84 MMHG | TEMPERATURE: 96.8 F | BODY MASS INDEX: 23.02 KG/M2 | WEIGHT: 147 LBS

## 2024-08-02 DIAGNOSIS — R10.30 LOWER ABDOMINAL PAIN: Primary | ICD-10-CM

## 2024-08-02 LAB
POC APPEARANCE, URINE: CLEAR
POC BILIRUBIN, URINE: NEGATIVE
POC BLOOD, URINE: NEGATIVE
POC COLOR, URINE: YELLOW
POC GLUCOSE, URINE: NEGATIVE MG/DL
POC KETONES, URINE: NEGATIVE MG/DL
POC LEUKOCYTES, URINE: NEGATIVE
POC NITRITE,URINE: NEGATIVE
POC PH, URINE: 6 PH
POC PROTEIN, URINE: NEGATIVE MG/DL
POC SPECIFIC GRAVITY, URINE: >=1.03
POC UROBILINOGEN, URINE: 0.2 EU/DL

## 2024-08-02 PROCEDURE — 81003 URINALYSIS AUTO W/O SCOPE: CPT | Performed by: FAMILY MEDICINE

## 2024-08-02 PROCEDURE — 1126F AMNT PAIN NOTED NONE PRSNT: CPT | Performed by: FAMILY MEDICINE

## 2024-08-02 PROCEDURE — 88142 CYTOPATH C/V THIN LAYER: CPT

## 2024-08-02 PROCEDURE — 3077F SYST BP >= 140 MM HG: CPT | Performed by: FAMILY MEDICINE

## 2024-08-02 PROCEDURE — 87086 URINE CULTURE/COLONY COUNT: CPT

## 2024-08-02 PROCEDURE — 1159F MED LIST DOCD IN RCRD: CPT | Performed by: FAMILY MEDICINE

## 2024-08-02 PROCEDURE — 3079F DIAST BP 80-89 MM HG: CPT | Performed by: FAMILY MEDICINE

## 2024-08-02 PROCEDURE — 87624 HPV HI-RISK TYP POOLED RSLT: CPT

## 2024-08-02 PROCEDURE — 87186 SC STD MICRODIL/AGAR DIL: CPT

## 2024-08-02 PROCEDURE — 99214 OFFICE O/P EST MOD 30 MIN: CPT | Performed by: FAMILY MEDICINE

## 2024-08-02 ASSESSMENT — PAIN SCALES - GENERAL: PAINLEVEL: 0-NO PAIN

## 2024-08-02 NOTE — PROGRESS NOTES
Subjective   Patient ID: Tere Moreira is a 64 y.o. female who presents for Annual Exam (Physical with pap).  HPI    65 yo AA female with a history of HLP,HTN,  here for the management of a low abdominal pain that started about 3 weeks ago and stopped since then.  No associated increased frequency, dysuria, low back pain, vaginal discharge, bleeding or odor.  Last pap smear was on 6/22019.     A review of system was completed.  All systems were reviewed and were normal, except for the ones that are listed in the HPI.    Objective   Physical Exam  Constitutional:       Appearance: Normal appearance.   HENT:      Head: Normocephalic and atraumatic.      Right Ear: Tympanic membrane, ear canal and external ear normal.      Left Ear: Tympanic membrane, ear canal and external ear normal.      Nose: Nose normal.      Mouth/Throat:      Mouth: Mucous membranes are moist.      Pharynx: Oropharynx is clear.   Eyes:      Extraocular Movements: Extraocular movements intact.      Conjunctiva/sclera: Conjunctivae normal.      Pupils: Pupils are equal, round, and reactive to light.   Cardiovascular:      Rate and Rhythm: Normal rate and regular rhythm.      Pulses: Normal pulses.   Pulmonary:      Effort: Pulmonary effort is normal.      Breath sounds: Normal breath sounds.   Abdominal:      General: Abdomen is flat. Bowel sounds are normal.      Palpations: Abdomen is soft.   Genitourinary:     Comments: Pap smear obtained today.   Musculoskeletal:         General: Normal range of motion.      Cervical back: Normal range of motion and neck supple.   Skin:     General: Skin is warm.   Neurological:      General: No focal deficit present.      Mental Status: She is alert and oriented to person, place, and time. Mental status is at baseline.   Psychiatric:         Mood and Affect: Mood normal.         Behavior: Behavior normal.         Thought Content: Thought content normal.         Judgment: Judgment normal.      Assessment/Plan   Problem List Items Addressed This Visit       Lower abdominal pain - Primary     -Etiology unsure.  -UA w/ C+S ordered.  -Pap smear done.  -consider pelvis US if not better with 2- 4 weeks.         Relevant Orders    POCT UA Automated manually resulted    Urine Culture    THINPREP PAP TEST        Patient to return to office in 1 week if not better.

## 2024-08-02 NOTE — ASSESSMENT & PLAN NOTE
-Etiology unsure.  -UA w/ C+S ordered.  -Pap smear done.  -consider pelvis US if not better with 2- 4 weeks.

## 2024-08-03 DIAGNOSIS — R35.0 INCREASED URINARY FREQUENCY: ICD-10-CM

## 2024-08-04 LAB — BACTERIA UR CULT: ABNORMAL

## 2024-08-05 LAB — BACTERIA UR CULT: ABNORMAL

## 2024-08-06 RX ORDER — TOLTERODINE 4 MG/1
4 CAPSULE, EXTENDED RELEASE ORAL DAILY
Qty: 90 CAPSULE | Refills: 1 | Status: SHIPPED | OUTPATIENT
Start: 2024-08-06 | End: 2025-02-02

## 2024-08-07 DIAGNOSIS — N30.00 ACUTE CYSTITIS WITHOUT HEMATURIA: Primary | ICD-10-CM

## 2024-08-07 RX ORDER — NITROFURANTOIN 25; 75 MG/1; MG/1
100 CAPSULE ORAL 2 TIMES DAILY
Qty: 20 CAPSULE | Refills: 0 | Status: SHIPPED | OUTPATIENT
Start: 2024-08-07 | End: 2024-08-17

## 2024-08-09 ENCOUNTER — TELEMEDICINE (OUTPATIENT)
Dept: PRIMARY CARE | Facility: CLINIC | Age: 64
End: 2024-08-09
Payer: COMMERCIAL

## 2024-08-09 DIAGNOSIS — N30.00 ACUTE CYSTITIS WITHOUT HEMATURIA: Primary | ICD-10-CM

## 2024-08-09 PROCEDURE — 99213 OFFICE O/P EST LOW 20 MIN: CPT | Performed by: FAMILY MEDICINE

## 2024-08-09 NOTE — PROGRESS NOTES
Subjective   Patient ID: Tere Moreira is a 64 y.o. female who presents for No chief complaint on file..  HPI    63 yo AA female with a history of HLP,HTN,  here for the management of a low abdominal pain that started about 3 weeks ago and stopped since then.  No associated increased frequency, dysuria, low back pain, vaginal discharge, bleeding or odor.  Last pap smear was on 6/22019.  Urine culture from 8/2/2024 showed sign of a UTI treated with Nitrofurantoin for 10 days.  Patient is concerned because she is treating the third UTI since 3/2024.    A review of system was completed.  All systems were reviewed and were normal, except for the ones that are listed in the HPI.    Objective   Physical Exam  Constitutional:       Appearance: Normal appearance.   HENT:      Head: Normocephalic and atraumatic.      Right Ear: Tympanic membrane, ear canal and external ear normal.      Left Ear: Tympanic membrane, ear canal and external ear normal.      Nose: Nose normal.      Mouth/Throat:      Mouth: Mucous membranes are moist.      Pharynx: Oropharynx is clear.   Eyes:      Extraocular Movements: Extraocular movements intact.      Conjunctiva/sclera: Conjunctivae normal.      Pupils: Pupils are equal, round, and reactive to light.   Cardiovascular:      Rate and Rhythm: Normal rate and regular rhythm.      Pulses: Normal pulses.   Pulmonary:      Effort: Pulmonary effort is normal.      Breath sounds: Normal breath sounds.   Abdominal:      General: Abdomen is flat. Bowel sounds are normal.      Palpations: Abdomen is soft.   Genitourinary:     Comments: Pap smear obtained today.   Musculoskeletal:         General: Normal range of motion.      Cervical back: Normal range of motion and neck supple.   Skin:     General: Skin is warm.   Neurological:      General: No focal deficit present.      Mental Status: She is alert and oriented to person, place, and time. Mental status is at baseline.   Psychiatric:         Mood  and Affect: Mood normal.         Behavior: Behavior normal.         Thought Content: Thought content normal.         Judgment: Judgment normal.     Assessment/Plan   Problem List Items Addressed This Visit       Acute cystitis without hematuria - Primary     -continue Nitrofurantoin for 10 days as prescribed.  -repeat UA w/ C+S when the antibiotic course is completed.         Relevant Orders    POCT UA Automated manually resulted    Urine Culture        Patient to return to office in 1 week if not better.

## 2024-08-09 NOTE — ASSESSMENT & PLAN NOTE
-continue Nitrofurantoin for 10 days as prescribed.  -repeat UA w/ C+S when the antibiotic course is completed.

## 2024-08-13 LAB
CYTOLOGY CMNT CVX/VAG CYTO-IMP: NORMAL
HPV HR 12 DNA GENITAL QL NAA+PROBE: NEGATIVE
HPV HR GENOTYPES PNL CVX NAA+PROBE: NEGATIVE
HPV16 DNA SPEC QL NAA+PROBE: NEGATIVE
HPV18 DNA SPEC QL NAA+PROBE: NEGATIVE
LAB AP HPV GENOTYPE QUESTION: YES
LAB AP HPV HR: NORMAL
LABORATORY COMMENT REPORT: NORMAL
LABORATORY COMMENT REPORT: NORMAL
PATH REPORT.TOTAL CANCER: NORMAL

## 2024-08-15 ENCOUNTER — TELEPHONE (OUTPATIENT)
Dept: PRIMARY CARE | Facility: CLINIC | Age: 64
End: 2024-08-15
Payer: COMMERCIAL

## 2024-08-23 ENCOUNTER — TELEPHONE (OUTPATIENT)
Dept: PRIMARY CARE | Facility: CLINIC | Age: 64
End: 2024-08-23
Payer: COMMERCIAL

## 2024-08-23 LAB
POC APPEARANCE, URINE: CLEAR
POC BILIRUBIN, URINE: NEGATIVE
POC BLOOD, URINE: ABNORMAL
POC COLOR, URINE: YELLOW
POC GLUCOSE, URINE: NEGATIVE MG/DL
POC KETONES, URINE: NEGATIVE MG/DL
POC LEUKOCYTES, URINE: NEGATIVE
POC NITRITE,URINE: NEGATIVE
POC PH, URINE: 7 PH
POC PROTEIN, URINE: NEGATIVE MG/DL
POC SPECIFIC GRAVITY, URINE: 1.02
POC UROBILINOGEN, URINE: 0.2 EU/DL

## 2024-08-30 ENCOUNTER — HOSPITAL ENCOUNTER (OUTPATIENT)
Dept: RADIOLOGY | Facility: CLINIC | Age: 64
Discharge: HOME | End: 2024-08-30
Payer: COMMERCIAL

## 2024-08-30 VITALS — BODY MASS INDEX: 22.44 KG/M2 | WEIGHT: 143 LBS | HEIGHT: 67 IN

## 2024-08-30 DIAGNOSIS — R35.0 INCREASED URINARY FREQUENCY: ICD-10-CM

## 2024-08-30 PROCEDURE — 77067 SCR MAMMO BI INCL CAD: CPT

## 2024-09-04 ENCOUNTER — TELEPHONE (OUTPATIENT)
Dept: PRIMARY CARE | Facility: CLINIC | Age: 64
End: 2024-09-04
Payer: COMMERCIAL

## 2024-09-04 DIAGNOSIS — R31.29 MICROSCOPIC HEMATURIA: Primary | ICD-10-CM

## 2024-09-05 ENCOUNTER — PATIENT MESSAGE (OUTPATIENT)
Dept: PRIMARY CARE | Facility: CLINIC | Age: 64
End: 2024-09-05
Payer: COMMERCIAL

## 2024-09-05 PROBLEM — R31.29 MICROSCOPIC HEMATURIA: Status: ACTIVE | Noted: 2024-09-05

## 2024-09-05 NOTE — TELEPHONE ENCOUNTER
Patient's urine test showed no signs of infection.  There was trace of blood.  We recommend a referral to a urologist for further assessment.  The urologist referral order was placed in the system.  The patient can call the general referral number in order to schedule the visit with the urologist.

## 2024-09-06 ENCOUNTER — TELEPHONE (OUTPATIENT)
Dept: PRIMARY CARE | Facility: CLINIC | Age: 64
End: 2024-09-06
Payer: COMMERCIAL

## 2024-09-10 ENCOUNTER — TELEPHONE (OUTPATIENT)
Dept: PRIMARY CARE | Facility: CLINIC | Age: 64
End: 2024-09-10
Payer: COMMERCIAL

## 2024-10-03 ENCOUNTER — TELEPHONE (OUTPATIENT)
Dept: PRIMARY CARE | Facility: CLINIC | Age: 64
End: 2024-10-03
Payer: COMMERCIAL

## 2024-10-04 ENCOUNTER — TELEPHONE (OUTPATIENT)
Dept: PRIMARY CARE | Facility: CLINIC | Age: 64
End: 2024-10-04
Payer: COMMERCIAL

## 2024-10-07 ENCOUNTER — APPOINTMENT (OUTPATIENT)
Dept: UROLOGY | Facility: CLINIC | Age: 64
End: 2024-10-07
Payer: MEDICARE

## 2024-10-07 VITALS
WEIGHT: 147 LBS | HEART RATE: 88 BPM | SYSTOLIC BLOOD PRESSURE: 112 MMHG | HEIGHT: 67 IN | DIASTOLIC BLOOD PRESSURE: 80 MMHG | BODY MASS INDEX: 23.07 KG/M2 | TEMPERATURE: 97 F

## 2024-10-07 DIAGNOSIS — N39.0 RECURRENT UTI: Primary | ICD-10-CM

## 2024-10-07 DIAGNOSIS — N39.41 URGE INCONTINENCE: ICD-10-CM

## 2024-10-07 DIAGNOSIS — R31.29 MICROSCOPIC HEMATURIA: ICD-10-CM

## 2024-10-07 DIAGNOSIS — N95.8 GENITOURINARY SYNDROME OF MENOPAUSE: ICD-10-CM

## 2024-10-07 LAB
POC APPEARANCE, URINE: CLEAR
POC BILIRUBIN, URINE: NEGATIVE
POC BLOOD, URINE: ABNORMAL
POC COLOR, URINE: YELLOW
POC GLUCOSE, URINE: NEGATIVE MG/DL
POC KETONES, URINE: NEGATIVE MG/DL
POC LEUKOCYTES, URINE: ABNORMAL
POC NITRITE,URINE: POSITIVE
POC PH, URINE: 5.5 PH
POC PROTEIN, URINE: ABNORMAL MG/DL
POC SPECIFIC GRAVITY, URINE: 1.02
POC UROBILINOGEN, URINE: 0.2 EU/DL

## 2024-10-07 PROCEDURE — 3008F BODY MASS INDEX DOCD: CPT | Performed by: PHYSICIAN ASSISTANT

## 2024-10-07 PROCEDURE — 81003 URINALYSIS AUTO W/O SCOPE: CPT | Performed by: PHYSICIAN ASSISTANT

## 2024-10-07 PROCEDURE — 87086 URINE CULTURE/COLONY COUNT: CPT

## 2024-10-07 PROCEDURE — 99204 OFFICE O/P NEW MOD 45 MIN: CPT | Performed by: PHYSICIAN ASSISTANT

## 2024-10-07 PROCEDURE — 3079F DIAST BP 80-89 MM HG: CPT | Performed by: PHYSICIAN ASSISTANT

## 2024-10-07 PROCEDURE — 81001 URINALYSIS AUTO W/SCOPE: CPT

## 2024-10-07 PROCEDURE — 3074F SYST BP LT 130 MM HG: CPT | Performed by: PHYSICIAN ASSISTANT

## 2024-10-07 PROCEDURE — 1126F AMNT PAIN NOTED NONE PRSNT: CPT | Performed by: PHYSICIAN ASSISTANT

## 2024-10-07 PROCEDURE — 87186 SC STD MICRODIL/AGAR DIL: CPT

## 2024-10-07 PROCEDURE — 1159F MED LIST DOCD IN RCRD: CPT | Performed by: PHYSICIAN ASSISTANT

## 2024-10-07 PROCEDURE — 1036F TOBACCO NON-USER: CPT | Performed by: PHYSICIAN ASSISTANT

## 2024-10-07 PROCEDURE — 51798 US URINE CAPACITY MEASURE: CPT | Performed by: PHYSICIAN ASSISTANT

## 2024-10-07 PROCEDURE — 1160F RVW MEDS BY RX/DR IN RCRD: CPT | Performed by: PHYSICIAN ASSISTANT

## 2024-10-07 RX ORDER — IBUPROFEN 800 MG/1
TABLET ORAL
COMMUNITY
Start: 2024-05-07

## 2024-10-07 RX ORDER — ESTRADIOL 0.1 MG/G
CREAM VAGINAL
Qty: 42.5 G | Refills: 2 | Status: SHIPPED | OUTPATIENT
Start: 2024-10-07

## 2024-10-07 ASSESSMENT — PAIN SCALES - GENERAL: PAINLEVEL: 0-NO PAIN

## 2024-10-07 NOTE — PROGRESS NOTES
"  [facility] [place]  Outpatient Clinic Note    Chief Complaint   Patient presents with    frequency uti       Subjective   Tere Moreira is a 64 y.o. female presenting for microscopic hematuria, recurrent UTI.     History of Present Illness:  Patient referred by her PCP for microscopic hematuria. 8/23/2024 POCT UA notable for trace blood, otherwise negative.  Microscopic urinalysis not performed.    Patient reports a history of UTI, 3 positive cultures. She reports she was only symptomatic with 1 of them in May. At that time symptoms included feeling like he had a very bad cold, malodorous urine and frequent urination. Did not experience dysuria. States in August she gave a urine specimen as a part of routine physical, asymptomatic. Same with March urine culture.   Last week she had increased urinary frequency which prompted today's appointment to be moved up to assess for UTI. She reports frequency resolved. Was prescribed Macrobid by PCP but did not start Rx, wanted to wait for today's appointment. Today she denies dysuria, urinary urgency, frequency or dysuria. Denies fevers, chills, nausea, vomiting or flank pain.   Denies gross hematuria. Denies history of kidney stones.   Fluid intake: 48oz tea, 48 oz sparking water, no pop, no alcohol/day. Occasional 1 cup coffee 2x/month.     Reports baseline urinary frequency that started in her 60s. Voiding every 2 hours during the day. Nocturia 0x. Endorses urge incontinence, happens 4-5 days out of the week, typically about 3x/day, depends on how long she delays voiding. Patient prescribed Detrol LA 4mg by PCP. She reports starting this in August. Feels this has improved urgency, can delay voiding longer. Does note dry mouth, not bothersome. \"Slight\" cosntipation.    Denies vaginal prolapse symptoms. Denies splinting. Denies vaginal bleeding. Not currently sexually active.   Had pelvic exam with PCP 8/2/24, pap smear NILM, neg HPV, findings consistent with " atrophy.    Has daily bowel movements. Denies fecal incontinence, mentions this has happened before but is not currently an issue.     Past Medical History and Surgical History   Past Medical History:   Diagnosis Date    Chest pain, unspecified 07/23/2019    Left-sided chest pain    Decreased white blood cell count, unspecified 05/01/2019    Leucopenia    Hypertension     Pain in left hip 01/16/2017    Left hip pain    Personal history of other diseases of the respiratory system 01/08/2020    History of acute sinusitis    Personal history of other endocrine, nutritional and metabolic disease     History of hyperlipidemia    Strain of unspecified muscle, fascia and tendon at shoulder and upper arm level, left arm, initial encounter 01/16/2017    Left shoulder strain     Past Surgical History:   Procedure Laterality Date    ADENOIDECTOMY      OTHER SURGICAL HISTORY  01/28/2020    Appendectomy laparoscopic     Medications  Current Outpatient Medications on File Prior to Visit   Medication Sig Dispense Refill    ibuprofen 800 mg tablet TAKE 1 TABLET (800 MG) BY MOUTH EVERY 8 HOURS IF NEEDED FOR MILD PAIN (1 - 3) (TAKE WITH FOOD).      aspirin 81 mg EC tablet Take 1 tablet (81 mg) by mouth once daily.      atorvastatin (Lipitor) 40 mg tablet TAKE 1 TABLET (40 MG) BY MOUTH ONCE DAILY AT BEDTIME. 90 tablet 1    betamethasone dipropionate 0.05 % cream APPLY TOPICALLY 2 TIMES A DAY AS NEEDED FOR IRRITATION OR RASH. 15 g 1    betamethasone valerate (Valisone) 0.1 % cream Apply topically 2 times a day. Apply topically to affected area on right posterior thigh bid. DO NOT USE ON FACE OR GROIN. 45 g 0    desonide (DesOwen) 0.05 % ointment APPLY TO AFFECTED AREA TWICE A DAY 30 g 2    ezetimibe (Zetia) 10 mg tablet TAKE 1 TABLET BY MOUTH EVERY DAY 90 tablet 1    hydroCHLOROthiazide (HYDRODiuril) 25 mg tablet 2 tablets (50 mg).      ketoconazole (NIZOral) 2 % cream APPLY TO AFFECTED AREA TWICE A DAY 15 g 1     losartan-hydrochlorothiazide (Hyzaar) 50-12.5 mg tablet TAKE 1 TABLET BY MOUTH EVERY DAY 90 tablet 1    tolterodine LA (Detrol LA) 4 mg 24 hr capsule TAKE 1 CAPSULE (4 MG) BY MOUTH ONCE DAILY. DO NOT CRUSH, CHEW, OR SPLIT. 90 capsule 1    [DISCONTINUED] losartan-hydrochlorothiazide (Hyzaar) 50-12.5 mg tablet TAKE 1 TABLET BY MOUTH EVERY DAY 90 tablet 1     No current facility-administered medications on file prior to visit.       Allergies:  Allergies   Allergen Reactions    Benzoyl Peroxide Hives    [place] Anaphylaxis and Hives     Objective     Physicial Exam  Vitals:    10/07/24 0904   BP: 112/80   Pulse: 88   Temp: 36.1 °C (97 °F)     Body mass index is 23.02 kg/m².    General: Well developed, well nourished, alert and cooperative, appears in no acute distress  Eyes: Non-injected conjunctiva, sclera clear, no proptosis  Cardiac: Extremities are warm and well perfused. No edema, cyanosis or pallor.   Lungs: Breathing is easy, non-labored. Speaking in clear and complete sentences. Normal diaphragmatic movement.  MSK: Ambulatory with steady gait, unassisted  Neuro: alert and oriented to person, place and time  Psych: Demonstrates good judgement and reason, without hallucinations, abnormal affect or abnormal behaviors.  Skin: no obvious lesions, no rashes.    PVR by US: 16mL    Results for orders placed or performed in visit on 10/07/24 (from the past 24 hour(s))   POCT UA Automated manually resulted   Result Value Ref Range    POC Color, Urine Yellow Straw, Yellow, Light-Yellow    POC Appearance, Urine Clear Clear    POC Glucose, Urine NEGATIVE NEGATIVE mg/dl    POC Bilirubin, Urine NEGATIVE NEGATIVE    POC Ketones, Urine NEGATIVE NEGATIVE mg/dl    POC Specific Gravity, Urine 1.020 1.005 - 1.035    POC Blood, Urine TRACE-Intact (A) NEGATIVE    POC PH, Urine 5.5 No Reference Range Established PH    POC Protein, Urine TRACE (A) NEGATIVE, 30 (1+) mg/dl    POC Urobilinogen, Urine 0.2 0.2, 1.0 EU/DL    Poc Nitrite, Urine  POSITIVE (A) NEGATIVE    POC Leukocytes, Urine SMALL (1+) (A) NEGATIVE         Chemistry    Lab Results   Component Value Date/Time     10/05/2023 1621    K 4.3 10/05/2023 1621     10/05/2023 1621    CO2 29 10/05/2023 1621    BUN 23 10/05/2023 1621    CREATININE 0.89 10/05/2023 1621    Lab Results   Component Value Date/Time    CALCIUM 9.9 10/05/2023 1621    ALKPHOS 50 10/05/2023 1621    AST 27 10/05/2023 1621    ALT 22 10/05/2023 1621    BILITOT 0.4 10/05/2023 1621        Lab Results   Component Value Date    URINECULTURE >100,000 Escherichia coli (A) 08/02/2024    URINECULTURE >100,000 Escherichia coli (A) 05/09/2024    URINECULTURE No significant growth 04/05/2024    URINECULTURE >100,000 Escherichia coli (A) 03/04/2024 8/23/24 POCT UA trace blood, otherwise negative     4/5/24 microscopic urinalysis negative     3/4/2024 microscopic urinalysis >20 RBC, positive urine culture as above    Review of Systems  All other systems have been reviewed and are negative for complaint.    Assessment and Plan:  Tere Moreira is a 64 y.o. female with microscopic hematuria, recurrent UTI, genitourinary syndrome of menopause.    > Microscopic hematuria: 8/23/2024 POCT UA notable for trace blood, otherwise negative.  Urine not sent for microscopic urinalysis.  Previous microscopic urinalysis reviewed, negative.  She has only had notable RBCs at the time of positive urine cultures.  We discussed according to the [employer], microscopic hematuria is defined by the presence of three or more red blood cells per high-powered field on microscopic examination of one properly collected, non-contaminated urinalysis with no evidence of infection.  Will send today's urine for microscopic urinalysis and culture. If microscopic urinalysis reveals true microscopic hematuria, we will plan for a diagnostic hematuria workup including: CT Urogram and cystoscopy.  Will follow-up on results.  If urine culture is  positive from today's visit, would plan to reassess urine after infection cleared.     > Recurrent UTI: Reviewed positive urine cultures as above, 3 in the past year.  Patient reports she was only symptomatic with 1 of these positive cultures.  Her POCT UA today is suggestive of an infection with nitrites, leukocytes and blood but she is currently asymptomatic, no worsened urgency or frequency today.  Will send today's urine for culture and urinalysis and reassess symptoms once finalized given patient did have worsened frequency last week. Discussed could consider treatment of this infection if culture positive to determine true baseline symptoms then monitor moving forward. We discussed asymptomatic bacteriuria versus UTI. Discussed would not recommend assessment of urine or for treatment with antibiotics unless patient has change in baseline urinary symptoms.     We discussed vaginal Estrace cream for prevention of UTIs, explained. She denies personal history of breast or gynecological cancer. Denies postmenopausal bleeding. I explained that small amounts of estrogen can be detected in the blood with use of Estrace cream, but there has not been studies that show increased risk of GYN or breast cancer.  Nevertheless the patient should follow up regularly for breast and gynecological cancer screening. Patient understands and desires to proceed. Rx for Estrace 1g vaginally twice weekly at bedtime sent to pharmacy.   Can consider cystoscopy for further evaluation in the future. Can consider addition of methenamine.     > Genitourinary syndrome of menopause: We discussed the pathophysiology of vaginal atrophy and how it contributes to irritative genitourinary symptoms as well as increased risk for UTI.  I have recommended a course of low-dose vaginal estrogen.  The safety profile of this medication was discussed with the patient.     > Urinary urgency, frequency, urge incontinence: Currently taking Detrol LA 4mg  prescribed by PCP. She feels this is working well for her. Not bothered by side effects enough to discontinue or change medication at this time. Will continue to monitor. PVR WNL today.      Follow up: Will contact patient with results of urine culture, discussion of next steps    All questions and concerns were addressed. Patient verbalizes understanding and has no other questions at this time.     Starla Padgett PA-C  10/07/24 9:58 AM  Clinic phone: [phone], [phone]

## 2024-10-08 ENCOUNTER — APPOINTMENT (OUTPATIENT)
Dept: UROLOGY | Facility: CLINIC | Age: 64
End: 2024-10-08
Payer: COMMERCIAL

## 2024-10-08 LAB
APPEARANCE UR: ABNORMAL
BACTERIA #/AREA URNS AUTO: ABNORMAL /HPF
BILIRUB UR STRIP.AUTO-MCNC: NEGATIVE MG/DL
COLOR UR: YELLOW
GLUCOSE UR STRIP.AUTO-MCNC: NORMAL MG/DL
KETONES UR STRIP.AUTO-MCNC: NEGATIVE MG/DL
LEUKOCYTE ESTERASE UR QL STRIP.AUTO: ABNORMAL
MUCOUS THREADS #/AREA URNS AUTO: ABNORMAL /LPF
NITRITE UR QL STRIP.AUTO: ABNORMAL
PH UR STRIP.AUTO: 5.5 [PH]
PROT UR STRIP.AUTO-MCNC: ABNORMAL MG/DL
RBC # UR STRIP.AUTO: ABNORMAL /UL
RBC #/AREA URNS AUTO: >20 /HPF
SP GR UR STRIP.AUTO: 1.02
SQUAMOUS #/AREA URNS AUTO: ABNORMAL /HPF
UROBILINOGEN UR STRIP.AUTO-MCNC: NORMAL MG/DL
WBC #/AREA URNS AUTO: >50 /HPF
WBC CLUMPS #/AREA URNS AUTO: ABNORMAL /HPF

## 2024-10-10 ENCOUNTER — TELEPHONE (OUTPATIENT)
Dept: UROLOGY | Facility: CLINIC | Age: 64
End: 2024-10-10
Payer: COMMERCIAL

## 2024-10-10 DIAGNOSIS — R35.0 URINARY FREQUENCY: Primary | ICD-10-CM

## 2024-10-10 LAB — BACTERIA UR CULT: ABNORMAL

## 2024-10-10 RX ORDER — SULFAMETHOXAZOLE AND TRIMETHOPRIM 800; 160 MG/1; MG/1
1 TABLET ORAL 2 TIMES DAILY
Qty: 6 TABLET | Refills: 0 | Status: SHIPPED | OUTPATIENT
Start: 2024-10-10 | End: 2024-10-13

## 2024-10-10 NOTE — TELEPHONE ENCOUNTER
Called patient regarding positive urine culture. 10/7/24 urine culture >100k Ecoli.   Spoke with patient, name and date of birth verified.  Patient denies dysuria but endorses worsened urinary frequency.  Denies fevers, chills, nausea, vomiting.  Given the urinary frequency, positive culture we agreed to treat infection with Bactrim DS p.o. twice daily x 3 days.  Allergies reviewed.  Prescription sent to pharmacy.  Treatment of this UTI will allow us to reassess her baseline urinary symptoms.  Patient requests urine to be reassessed after treatment of UTI.  Will plan for in person visit next week to reassess urine, symptoms and discuss plan moving forward.  Patient has started vaginal estrogen cream, 1 dose so far.  Will plan to continue.  Will follow-up next week for further discussion of plan moving forward.  No other questions or concerns at this time    Starla Padgett PA-C  10/10/24 9:01 AM  Clinic phone: [phone], [phone]

## 2024-10-11 NOTE — PROGRESS NOTES
[facility]  Outpatient Clinic Note    No chief complaint on file.    Subjective   Tere Moreira is a 64 y.o. female presenting for follow up recurrent UTI.     History of Present Illness:  Previous urologic workup:  > PVR by US 10/7/24: 16mL    Previous urologic therapy:  > Detrol LA 4mg prescribed by PCP - current   > Estrace vaginal cream 10/7/24 -     At last visit patient was started on vaginal estrogen cream for UTI with plan to continue Detrol. 10/7/24 urine culture positive, >100k Ecoli, patient with increased frequency. She was treated with Bactrim DS x 3 days. She presents today for repeat urinalysis and culture as well as treatment discussion.     Today she reports she is doing well. She compelted Bactrim course. Feels her frequency is relatively unchanged. Voiding every 1.5 hours during the day. Relates this to her fluid intake. Denies dysuria. Denies hematuria. Denies vaginal bleeding. Using vaginal estrogen cream without issue.     Her Pmhx, surgical history, meds, allergies were reviewed and updated as needed.     Initial consult 10/7/24:   Patient referred by her PCP for microscopic hematuria. 8/23/2024 POCT UA notable for trace blood, otherwise negative.  Microscopic urinalysis not performed.    Patient reports a history of UTI, 3 positive cultures. She reports she was only symptomatic with 1 of them in May. At that time symptoms included feeling like he had a very bad cold, malodorous urine and frequent urination. Did not experience dysuria. States in August she gave a urine specimen as a part of routine physical, asymptomatic. Same with March urine culture.   Last week she had increased urinary frequency which prompted today's appointment to be moved up to assess for UTI. She reports frequency resolved. Was prescribed Macrobid by PCP but did not start Rx, wanted to wait for today's appointment. Today she denies dysuria, urinary urgency, frequency or dysuria. Denies fevers, chills,  "nausea, vomiting or flank pain.   Denies gross hematuria. Denies history of kidney stones.   Fluid intake: 48oz tea, 48 oz sparking water, no pop, no alcohol/day. Occasional 1 cup coffee 2x/month.     Reports baseline urinary frequency that started in her 60s. Voiding every 2 hours during the day. Nocturia 0x. Endorses urge incontinence, happens 4-5 days out of the week, typically about 3x/day, depends on how long she delays voiding. Patient prescribed Detrol LA 4mg by PCP. She reports starting this in August. Feels this has improved urgency, can delay voiding longer. Does note dry mouth, not bothersome. \"Slight\" cosntipation.    Denies vaginal prolapse symptoms. Denies splinting. Denies vaginal bleeding. Not currently sexually active.   Had pelvic exam with PCP 8/2/24, pap smear NILM, neg HPV, findings consistent with atrophy.    Has daily bowel movements. Denies fecal incontinence, mentions this has happened before but is not currently an issue.     Past Medical History and Surgical History   Past Medical History:   Diagnosis Date    Chest pain, unspecified 07/23/2019    Left-sided chest pain    Decreased white blood cell count, unspecified 05/01/2019    Leucopenia    Hypertension     Pain in left hip 01/16/2017    Left hip pain    Personal history of other diseases of the respiratory system 01/08/2020    History of acute sinusitis    Personal history of other endocrine, nutritional and metabolic disease     History of hyperlipidemia    Strain of unspecified muscle, fascia and tendon at shoulder and upper arm level, left arm, initial encounter 01/16/2017    Left shoulder strain     Past Surgical History:   Procedure Laterality Date    ADENOIDECTOMY      OTHER SURGICAL HISTORY  01/28/2020    Appendectomy laparoscopic     Medications  Current Outpatient Medications on File Prior to Visit   Medication Sig Dispense Refill    aspirin 81 mg EC tablet Take 1 tablet (81 mg) by mouth once daily.      atorvastatin (Lipitor) " 40 mg tablet TAKE 1 TABLET (40 MG) BY MOUTH ONCE DAILY AT BEDTIME. 90 tablet 1    betamethasone dipropionate 0.05 % cream APPLY TOPICALLY 2 TIMES A DAY AS NEEDED FOR IRRITATION OR RASH. 15 g 1    betamethasone valerate (Valisone) 0.1 % cream Apply topically 2 times a day. Apply topically to affected area on right posterior thigh bid. DO NOT USE ON FACE OR GROIN. 45 g 0    desonide (DesOwen) 0.05 % ointment APPLY TO AFFECTED AREA TWICE A DAY 30 g 2    estradiol (Estrace) 0.01 % (0.1 mg/gram) vaginal cream Insert 1g vaginally twice weekly at bedtime 42.5 g 2    ezetimibe (Zetia) 10 mg tablet TAKE 1 TABLET BY MOUTH EVERY DAY 90 tablet 1    hydroCHLOROthiazide (HYDRODiuril) 25 mg tablet 2 tablets (50 mg).      ibuprofen 800 mg tablet TAKE 1 TABLET (800 MG) BY MOUTH EVERY 8 HOURS IF NEEDED FOR MILD PAIN (1 - 3) (TAKE WITH FOOD).      ketoconazole (NIZOral) 2 % cream APPLY TO AFFECTED AREA TWICE A DAY 15 g 1    losartan-hydrochlorothiazide (Hyzaar) 50-12.5 mg tablet TAKE 1 TABLET BY MOUTH EVERY DAY 90 tablet 1    [] sulfamethoxazole-trimethoprim (Bactrim DS) 800-160 mg tablet Take 1 tablet by mouth 2 times a day for 3 days. 6 tablet 0    tolterodine LA (Detrol LA) 4 mg 24 hr capsule TAKE 1 CAPSULE (4 MG) BY MOUTH ONCE DAILY. DO NOT CRUSH, CHEW, OR SPLIT. 90 capsule 1     No current facility-administered medications on file prior to visit.       Allergies:  Allergies   Allergen Reactions    Benzoyl Peroxide Hives    [place] Anaphylaxis and Hives     Objective     Physicial Exam  Vitals:    10/17/24 1040   BP: 109/73   Pulse: 88   Temp: 35.9 °C (96.7 °F)     Body mass index is 23.02 kg/m².    General: Well developed, well nourished, alert and cooperative, appears in no acute distress  Eyes: Non-injected conjunctiva, sclera clear, no proptosis  Cardiac: Extremities are warm and well perfused. No edema, cyanosis or pallor.   Lungs: Breathing is easy, non-labored. Speaking in clear and complete sentences. Normal  diaphragmatic movement.  MSK: Ambulatory with steady gait, unassisted  Neuro: alert and oriented to person, place and time  Psych: Demonstrates good judgement and reason, without hallucinations, abnormal affect or abnormal behaviors.  Skin: no obvious lesions, no rashes.    Results for orders placed or performed in visit on 10/17/24 (from the past 24 hours)   POCT UA (nonautomated) manually resulted   Result Value Ref Range    POC Color, Urine Yellow Straw, Yellow, Light-Yellow    POC Appearance, Urine Clear Clear    POC Glucose, Urine NEGATIVE NEGATIVE mg/dl    POC Bilirubin, Urine NEGATIVE NEGATIVE    POC Ketones, Urine TRACE (A) NEGATIVE mg/dl    POC Specific Gravity, Urine 1.020 1.005 - 1.035    POC Blood, Urine TRACE-Intact (A) NEGATIVE    POC PH, Urine 6.0 No Reference Range Established PH    POC Protein, Urine NEGATIVE NEGATIVE, 30 (1+) mg/dl    POC Urobilinogen, Urine 0.2 0.2, 1.0 EU/DL    Poc Nitrite, Urine NEGATIVE NEGATIVE    POC Leukocytes, Urine NEGATIVE NEGATIVE         Chemistry    Lab Results   Component Value Date/Time     10/05/2023 1621    K 4.3 10/05/2023 1621     10/05/2023 1621    CO2 29 10/05/2023 1621    BUN 23 10/05/2023 1621    CREATININE 0.89 10/05/2023 1621    Lab Results   Component Value Date/Time    CALCIUM 9.9 10/05/2023 1621    ALKPHOS 50 10/05/2023 1621    AST 27 10/05/2023 1621    ALT 22 10/05/2023 1621    BILITOT 0.4 10/05/2023 1621        Lab Results   Component Value Date    URINECULTURE >100,000 Escherichia coli (A) 10/07/2024    URINECULTURE >100,000 Escherichia coli (A) 08/02/2024    URINECULTURE >100,000 Escherichia coli (A) 05/09/2024    URINECULTURE No significant growth 04/05/2024    URINECULTURE >100,000 Escherichia coli (A) 03/04/2024 8/23/24 POCT UA trace blood, otherwise negative - no urinalysis performed     4/5/24 microscopic urinalysis negative     3/4/2024 microscopic urinalysis >20 RBC, positive urine culture as above    Review of Systems  All other  systems have been reviewed and are negative for complaint.    Assessment and Plan:  Tere Moreira is a 64 y.o. female with microscopic hematuria, recurrent UTI, genitourinary syndrome of menopause. Patient recently finished Bactrim course for UTI, symptom of frequency which has been unchanged s/p treatment.     > Microscopic hematuria: We discussed according to the [employer], microscopic hematuria is defined by the presence of three or more red blood cells per high-powered field on microscopic examination of one properly collected, non-contaminated urinalysis with no evidence of infection.  Will send today's urine for microscopic urinalysis and culture. If microscopic urinalysis reveals true microscopic hematuria, we will plan for a diagnostic hematuria workup including: CT Urogram and cystoscopy.  Will contact patient with results.     > Recurrent UTI: Reviewed positive urine cultures as above, 4 in the past year.  Patient reports she was only symptomatic with 1 of these positive cultures. She did report frequency with last positive culture, was treated with Bactrim, reports today frequency unchanged with treatment.   Given patient has been asymptomatic with 3/4 infections, we discussed asymptomatic bacteriuria versus UTI. Discussed would not recommend assessment of urine or for treatment with antibiotics unless patient has change in baseline urinary symptoms.   We discussed management options for recurrent UTI including vaginal estrogen cream, methenamine, daily antibiotic suppression. Given patient has been asymptomatic, through shared decision making, we agreed to trial vaginal estrogen cream and monitor symptoms. If patient is symptomatic and has positive culture, can discuss addition of methenamine or antibiotic suppression.     We recommend d-mannose for preventing frequent UTI. We recommend 2 g once daily or 1 g twice daily. We discussed that side effects are rare but some patients  may have loose stools or diarrhea. Rx sent to pharmacy. If not covered by insurance, available OTC. Provided patient education handout.     Standing order for urine culture placed. If patient symptomatic of UTI, can provide urine sample at any  lab. Will contact patient with results once finalized.      > Genitourinary syndrome of menopause: Continue vaginal estrogen cream twice weekly.     > Urinary urgency, frequency, urge incontinence: Currently taking Detrol LA 4mg prescribed by PCP. She feels this is working well for her. Not bothered by side effects enough to discontinue or change medication at this time. Will continue to monitor. Discussed if she feels urinary symptoms no longer well controlled with Detrol, can trial alternate medication.      Follow up: Will contact patient with results urinalysis, micro hematuria. 3 months for UTI follow up.     All questions and concerns were addressed. Patient verbalizes understanding and has no other questions at this time.     Starla Padgett PA-C  10/17/24 11:26 AM  Clinic phone: [phone], [phone]

## 2024-10-16 ENCOUNTER — APPOINTMENT (OUTPATIENT)
Dept: UROLOGY | Facility: CLINIC | Age: 64
End: 2024-10-16
Payer: COMMERCIAL

## 2024-10-17 ENCOUNTER — APPOINTMENT (OUTPATIENT)
Dept: UROLOGY | Facility: CLINIC | Age: 64
End: 2024-10-17
Payer: COMMERCIAL

## 2024-10-17 VITALS
HEART RATE: 88 BPM | WEIGHT: 147 LBS | HEIGHT: 67 IN | BODY MASS INDEX: 23.07 KG/M2 | SYSTOLIC BLOOD PRESSURE: 109 MMHG | TEMPERATURE: 96.7 F | DIASTOLIC BLOOD PRESSURE: 73 MMHG

## 2024-10-17 DIAGNOSIS — R31.29 MICROSCOPIC HEMATURIA: ICD-10-CM

## 2024-10-17 DIAGNOSIS — N39.0 RECURRENT UTI: Primary | ICD-10-CM

## 2024-10-17 LAB
POC APPEARANCE, URINE: CLEAR
POC BILIRUBIN, URINE: NEGATIVE
POC BLOOD, URINE: ABNORMAL
POC COLOR, URINE: YELLOW
POC GLUCOSE, URINE: NEGATIVE MG/DL
POC KETONES, URINE: ABNORMAL MG/DL
POC LEUKOCYTES, URINE: NEGATIVE
POC NITRITE,URINE: NEGATIVE
POC PH, URINE: 6 PH
POC PROTEIN, URINE: NEGATIVE MG/DL
POC SPECIFIC GRAVITY, URINE: 1.02
POC UROBILINOGEN, URINE: 0.2 EU/DL

## 2024-10-17 PROCEDURE — 81002 URINALYSIS NONAUTO W/O SCOPE: CPT | Performed by: PHYSICIAN ASSISTANT

## 2024-10-17 PROCEDURE — 1160F RVW MEDS BY RX/DR IN RCRD: CPT | Performed by: PHYSICIAN ASSISTANT

## 2024-10-17 PROCEDURE — 1159F MED LIST DOCD IN RCRD: CPT | Performed by: PHYSICIAN ASSISTANT

## 2024-10-17 PROCEDURE — 87086 URINE CULTURE/COLONY COUNT: CPT

## 2024-10-17 PROCEDURE — 3074F SYST BP LT 130 MM HG: CPT | Performed by: PHYSICIAN ASSISTANT

## 2024-10-17 PROCEDURE — G2211 COMPLEX E/M VISIT ADD ON: HCPCS | Performed by: PHYSICIAN ASSISTANT

## 2024-10-17 PROCEDURE — 81003 URINALYSIS AUTO W/O SCOPE: CPT

## 2024-10-17 PROCEDURE — 1036F TOBACCO NON-USER: CPT | Performed by: PHYSICIAN ASSISTANT

## 2024-10-17 PROCEDURE — 3078F DIAST BP <80 MM HG: CPT | Performed by: PHYSICIAN ASSISTANT

## 2024-10-17 PROCEDURE — 99214 OFFICE O/P EST MOD 30 MIN: CPT | Performed by: PHYSICIAN ASSISTANT

## 2024-10-17 PROCEDURE — 3008F BODY MASS INDEX DOCD: CPT | Performed by: PHYSICIAN ASSISTANT

## 2024-10-18 LAB
APPEARANCE UR: CLEAR
BACTERIA UR CULT: NORMAL
BILIRUB UR STRIP.AUTO-MCNC: NEGATIVE MG/DL
COLOR UR: YELLOW
GLUCOSE UR STRIP.AUTO-MCNC: NORMAL MG/DL
KETONES UR STRIP.AUTO-MCNC: NEGATIVE MG/DL
LEUKOCYTE ESTERASE UR QL STRIP.AUTO: NEGATIVE
NITRITE UR QL STRIP.AUTO: NEGATIVE
PH UR STRIP.AUTO: 6 [PH]
PROT UR STRIP.AUTO-MCNC: NEGATIVE MG/DL
RBC # UR STRIP.AUTO: NEGATIVE /UL
SP GR UR STRIP.AUTO: 1.03
UROBILINOGEN UR STRIP.AUTO-MCNC: NORMAL MG/DL

## 2024-10-21 ENCOUNTER — APPOINTMENT (OUTPATIENT)
Dept: PRIMARY CARE | Facility: CLINIC | Age: 64
End: 2024-10-21
Payer: COMMERCIAL

## 2024-10-23 ENCOUNTER — APPOINTMENT (OUTPATIENT)
Dept: PRIMARY CARE | Facility: CLINIC | Age: 64
End: 2024-10-23
Payer: MEDICARE

## 2024-10-23 VITALS
HEART RATE: 71 BPM | WEIGHT: 147 LBS | DIASTOLIC BLOOD PRESSURE: 76 MMHG | SYSTOLIC BLOOD PRESSURE: 118 MMHG | BODY MASS INDEX: 23.02 KG/M2 | TEMPERATURE: 96 F

## 2024-10-23 DIAGNOSIS — M54.81 BILATERAL OCCIPITAL NEURALGIA: Primary | ICD-10-CM

## 2024-10-23 PROCEDURE — 99214 OFFICE O/P EST MOD 30 MIN: CPT | Performed by: FAMILY MEDICINE

## 2024-10-23 PROCEDURE — 3074F SYST BP LT 130 MM HG: CPT | Performed by: FAMILY MEDICINE

## 2024-10-23 PROCEDURE — 1124F ACP DISCUSS-NO DSCNMKR DOCD: CPT | Performed by: FAMILY MEDICINE

## 2024-10-23 PROCEDURE — 3078F DIAST BP <80 MM HG: CPT | Performed by: FAMILY MEDICINE

## 2024-10-23 PROCEDURE — 1159F MED LIST DOCD IN RCRD: CPT | Performed by: FAMILY MEDICINE

## 2024-10-23 PROCEDURE — 1125F AMNT PAIN NOTED PAIN PRSNT: CPT | Performed by: FAMILY MEDICINE

## 2024-10-23 RX ORDER — METHYLPREDNISOLONE 4 MG/1
TABLET ORAL
Qty: 21 TABLET | Refills: 0 | Status: SHIPPED | OUTPATIENT
Start: 2024-10-23 | End: 2024-10-30

## 2024-10-23 ASSESSMENT — ENCOUNTER SYMPTOMS: HEADACHES: 1

## 2024-10-23 ASSESSMENT — PAIN SCALES - GENERAL: PAINLEVEL_OUTOF10: 6

## 2024-10-23 NOTE — PROGRESS NOTES
Subjective   Patient ID: Tere Moreira is a 64 y.o. female who presents for Headache.  Headache     65 yo AA female with a history of HLP,HTN,  here for the management of   Headache.  It is radiating upward into the skull bilaterally.       A review of system was completed.  All systems were reviewed and were normal, except for the ones that are listed in the HPI.    Objective   Physical Exam  Constitutional:       Appearance: Normal appearance.   HENT:      Head: Normocephalic and atraumatic.      Right Ear: Tympanic membrane, ear canal and external ear normal.      Left Ear: Tympanic membrane, ear canal and external ear normal.      Nose: Nose normal.      Mouth/Throat:      Mouth: Mucous membranes are moist.      Pharynx: Oropharynx is clear.   Eyes:      Extraocular Movements: Extraocular movements intact.      Conjunctiva/sclera: Conjunctivae normal.      Pupils: Pupils are equal, round, and reactive to light.   Cardiovascular:      Rate and Rhythm: Normal rate and regular rhythm.      Pulses: Normal pulses.   Pulmonary:      Effort: Pulmonary effort is normal.      Breath sounds: Normal breath sounds.   Abdominal:      General: Abdomen is flat. Bowel sounds are normal.      Palpations: Abdomen is soft.   Musculoskeletal:         General: Normal range of motion.      Cervical back: Normal range of motion and neck supple.   Skin:     General: Skin is warm.   Neurological:      General: No focal deficit present.      Mental Status: She is alert and oriented to person, place, and time. Mental status is at baseline.   Psychiatric:         Mood and Affect: Mood normal.         Behavior: Behavior normal.         Thought Content: Thought content normal.         Judgment: Judgment normal.     Assessment/Plan   Problem List Items Addressed This Visit       Bilateral occipital neuralgia - Primary     -Medrol dosepack ordered.  -Then Ibuprofen 800 mg TID for 7 days.  -Pain management referral done; to consider if  not better with the above regiment         Relevant Medications    methylPREDNISolone (Medrol Dospak) 4 mg tablets    Other Relevant Orders    Referral to Pain Medicine      Patient to return to office if not better within 2- 4 weeks.

## 2024-10-23 NOTE — ASSESSMENT & PLAN NOTE
-Medrol dosepack ordered.  -Then Ibuprofen 800 mg TID for 7 days.  -Pain management referral done; to consider if not better with the above regiment

## 2024-10-25 ENCOUNTER — TELEPHONE (OUTPATIENT)
Dept: UROLOGY | Facility: CLINIC | Age: 64
End: 2024-10-25
Payer: COMMERCIAL

## 2024-10-25 NOTE — TELEPHONE ENCOUNTER
Received message from patient requesting call back regarding urine studies. Spoke with patient. Reviewed 10/17/24 urine culture no growth, urinalysis negative for blood. NO further workup necessary at this time. Follow up as scheduled in December.     Starla Padgett PA-C  10/25/24 11:07 AM  Clinic phone: [phone], [phone]

## 2024-10-30 DIAGNOSIS — R73.03 PREDIABETES: ICD-10-CM

## 2024-10-30 DIAGNOSIS — E78.5 HYPERLIPIDEMIA, UNSPECIFIED: ICD-10-CM

## 2024-10-31 ENCOUNTER — APPOINTMENT (OUTPATIENT)
Dept: PRIMARY CARE | Facility: CLINIC | Age: 64
End: 2024-10-31
Payer: COMMERCIAL

## 2024-10-31 RX ORDER — ATORVASTATIN CALCIUM 40 MG/1
40 TABLET, FILM COATED ORAL NIGHTLY
Qty: 90 TABLET | Refills: 1 | Status: SHIPPED | OUTPATIENT
Start: 2024-10-31

## 2024-10-31 RX ORDER — EZETIMIBE 10 MG/1
10 TABLET ORAL DAILY
Qty: 90 TABLET | Refills: 1 | Status: SHIPPED | OUTPATIENT
Start: 2024-10-31

## 2024-11-15 ENCOUNTER — HOSPITAL ENCOUNTER (OUTPATIENT)
Dept: RADIOLOGY | Facility: HOSPITAL | Age: 64
Discharge: HOME | End: 2024-11-15
Payer: MEDICARE

## 2024-11-15 ENCOUNTER — OFFICE VISIT (OUTPATIENT)
Dept: PAIN MEDICINE | Facility: HOSPITAL | Age: 64
End: 2024-11-15
Payer: MEDICARE

## 2024-11-15 DIAGNOSIS — M54.11 RADICULOPATHY OF OCCIPITO-ATLANTO-AXIAL REGION: ICD-10-CM

## 2024-11-15 DIAGNOSIS — M54.16 LUMBAR RADICULOPATHY: ICD-10-CM

## 2024-11-15 DIAGNOSIS — M54.81 BILATERAL OCCIPITAL NEURALGIA: ICD-10-CM

## 2024-11-15 DIAGNOSIS — M54.12 CERVICAL NEURITIS: ICD-10-CM

## 2024-11-15 DIAGNOSIS — R35.0 INCREASED URINARY FREQUENCY: ICD-10-CM

## 2024-11-15 DIAGNOSIS — M54.12 CERVICAL NEURITIS: Primary | ICD-10-CM

## 2024-11-15 PROCEDURE — 72050 X-RAY EXAM NECK SPINE 4/5VWS: CPT

## 2024-11-15 PROCEDURE — 72120 X-RAY BEND ONLY L-S SPINE: CPT

## 2024-11-15 PROCEDURE — 99204 OFFICE O/P NEW MOD 45 MIN: CPT | Performed by: PAIN MEDICINE

## 2024-11-15 PROCEDURE — 99214 OFFICE O/P EST MOD 30 MIN: CPT | Performed by: PAIN MEDICINE

## 2024-11-15 PROCEDURE — 1125F AMNT PAIN NOTED PAIN PRSNT: CPT | Performed by: PAIN MEDICINE

## 2024-11-15 RX ORDER — GABAPENTIN 100 MG/1
CAPSULE ORAL
Qty: 63 CAPSULE | Refills: 0 | Status: SHIPPED | OUTPATIENT
Start: 2024-11-15 | End: 2024-12-13

## 2024-11-15 ASSESSMENT — PAIN - FUNCTIONAL ASSESSMENT: PAIN_FUNCTIONAL_ASSESSMENT: 0-10

## 2024-11-15 ASSESSMENT — PAIN SCALES - GENERAL: PAINLEVEL_OUTOF10: 6

## 2024-11-15 NOTE — PROGRESS NOTES
Subjective   Patient ID: Tere Moreira is a 64 y.o. female with a past medical history of bilateral occipital neuralgia and lumbar radiculopathy. She is referred by PCP.     HPI:   Occipital Neuralgia  Pain started 4 months ago, no known trigger. It starts at the posterior base of the skull, radiates over the top of the head, and ends at the crown. She describes it as an intermittent sharp pain. No particular head movements exacerbate it. Nothing makes it better. She is having difficulty finding comfortable positions to sleep, making falling asleep difficult. No falls. No red flag symptoms. Pain regimen as below.    Right Leg Radiculopathy  Pain started 7 months ago, no known triggering incident. It starts just posterior to the lateral malleolus in the right leg, radiates up the lateral side of the lower leg into the thigh, and ends in the right buttocks. She describes it as a constant, burning pain. Prolonged sitting and lying on her right side makes thing worse. Lying on the left side or back, standing up make it better. She uses Tylenol and occasionally NSAIDs.    Physical Therapy: The patient has not done physical therapy within the past six months  Other Conservative Measures she has tried:  N/A  Classes of medications tried in the past: Acetaminophen and NSAIDs    Review of Systems   13-point ROS done and negative except for HPI.     Current Outpatient Medications   Medication Instructions    aspirin 81 mg, oral, Daily    atorvastatin (LIPITOR) 40 mg, oral, Nightly    betamethasone valerate (Valisone) 0.1 % cream Topical, 2 times daily, Apply topically to affected area on right posterior thigh bid. DO NOT USE ON FACE OR GROIN.    d-mannose 1 g, oral, 2 times daily    desonide (DesOwen) 0.05 % ointment Topical, 2 times daily, to affected area    estradiol (Estrace) 0.01 % (0.1 mg/gram) vaginal cream Insert 1g vaginally twice weekly at bedtime    ezetimibe (ZETIA) 10 mg, oral, Daily    hydroCHLOROthiazide  (HYDRODIURIL) 50 mg    ibuprofen 800 mg tablet TAKE 1 TABLET (800 MG) BY MOUTH EVERY 8 HOURS IF NEEDED FOR MILD PAIN (1 - 3) (TAKE WITH FOOD).    ketoconazole (NIZOral) 2 % cream Topical, 2 times daily, to affected area    losartan-hydrochlorothiazide (Hyzaar) 50-12.5 mg tablet 1 tablet, oral, Daily    tolterodine LA (DETROL LA) 4 mg, oral, Daily, Do not crush, chew, or split.       Past Medical History:   Diagnosis Date    Chest pain, unspecified 07/23/2019    Left-sided chest pain    Decreased white blood cell count, unspecified 05/01/2019    Leucopenia    Hypertension     Pain in left hip 01/16/2017    Left hip pain    Personal history of other diseases of the respiratory system 01/08/2020    History of acute sinusitis    Personal history of other endocrine, nutritional and metabolic disease     History of hyperlipidemia    Strain of unspecified muscle, fascia and tendon at shoulder and upper arm level, left arm, initial encounter 01/16/2017    Left shoulder strain        Past Surgical History:   Procedure Laterality Date    ADENOIDECTOMY      OTHER SURGICAL HISTORY  01/28/2020    Appendectomy laparoscopic        Family History   Problem Relation Name Age of Onset    Hypertension Mother      Hypertension Father      Breast cancer Mother's Sister  60 - 69        Allergies   Allergen Reactions    Benzoyl Peroxide Hives    Torres Anaphylaxis and Hives        Objective     There were no vitals filed for this visit.     Physical Exam  General: NAD, well groomed, well nourished  Eyes: Non-icteric sclera, EOMI  Ears, Nose, Mouth, and Throat: External ears and nose appear to be without deformity or rash. No lesions or masses noted. Hearing is grossly intact.   Neck: Trachea midline  Respiratory: Nonlabored breathing   Cardiovascular: no peripheral edema   Skin: No rashes or open lesions/ulcers identified on skin.    Back:   Palpation: No tenderness to palpation over lumbar paraspinous muscles.   Straight leg raise:  positive at 30 degrees on the right     Hip: No pain over greater trochanters.    Neck:  TTP at the base of the neck  Normal ROM  Pain with head rotation  Negative Spurling's    Face:  No TTP  Normal sensation  Cranial nerves II-XII grossly intact.     Neurologic:   Strength 5/5 and symmetric in BUE & BLE  Sensation: Normal to light touch intact throughout.  DTRs:not done  Allen:  not done  Clonus: absent    Psychiatric: Alert, orientation to person, place, and time. Cooperative.    Imaging personally reviewed and independently interpreted:  XR right knee: No acute fracture or dislocation either knee. Bilateral   tricompartmental osteophytes. Suspect severe patellofemoral joint   space narrowing bilaterally.       Assessment/Plan   Tere Moreira is a 64 y.o. female who is referred for bilateral occipital neuralgia and RLE radiculopathy. Exam of head and neck is consistent with C2 neuralgia likely 2/2 OA. Will obtain XR and start gabapentin at night. Lumbar radiculopathy is likely 2/2 OA as well. Will obtain XR and refer to PT. If no resolution of symptoms, will obtain MR lumbar spine.    Plan:  - XR cervical spine  - XR lumbar spine  - Start gabapentin 100mg at bedtime, uptitrated to 300mg  - PT referral for sciatica  - Plan to obtain MR lumbar spine if symptoms fail to resolve with PT    The patient has failed treatment with : three or more classes of medications, have significant limitations in their sleep quality due to the pain, have significant limitations of their quality of life due to the pain, and have significant impairments of their activities of daily living (ADLs) due to the pain    Follow up: As needed     The patient was invited to contact us back anytime with any questions or concerns and follow-up with us in the office as needed.     Diagnoses and all orders for this visit:  Bilateral occipital neuralgia  -     Referral to Pain Medicine      This note was generated with the aid of dictation  software, there may be typos despite my attempts at proofreading.

## 2024-11-19 RX ORDER — TOLTERODINE 4 MG/1
4 CAPSULE, EXTENDED RELEASE ORAL DAILY
Qty: 90 CAPSULE | Refills: 1 | Status: SHIPPED | OUTPATIENT
Start: 2024-11-19 | End: 2025-05-18

## 2024-12-17 ENCOUNTER — APPOINTMENT (OUTPATIENT)
Dept: UROLOGY | Facility: CLINIC | Age: 64
End: 2024-12-17
Payer: COMMERCIAL

## 2024-12-17 VITALS — WEIGHT: 147 LBS | HEIGHT: 67 IN | TEMPERATURE: 96.6 F | BODY MASS INDEX: 23.07 KG/M2

## 2024-12-17 DIAGNOSIS — R35.0 URINARY FREQUENCY: ICD-10-CM

## 2024-12-17 DIAGNOSIS — N95.8 GENITOURINARY SYNDROME OF MENOPAUSE: ICD-10-CM

## 2024-12-17 DIAGNOSIS — N39.0 RECURRENT UTI: Primary | ICD-10-CM

## 2024-12-17 PROCEDURE — 1036F TOBACCO NON-USER: CPT | Performed by: PHYSICIAN ASSISTANT

## 2024-12-17 PROCEDURE — 1159F MED LIST DOCD IN RCRD: CPT | Performed by: PHYSICIAN ASSISTANT

## 2024-12-17 PROCEDURE — 3008F BODY MASS INDEX DOCD: CPT | Performed by: PHYSICIAN ASSISTANT

## 2024-12-17 PROCEDURE — 1160F RVW MEDS BY RX/DR IN RCRD: CPT | Performed by: PHYSICIAN ASSISTANT

## 2024-12-17 PROCEDURE — 99214 OFFICE O/P EST MOD 30 MIN: CPT | Performed by: PHYSICIAN ASSISTANT

## 2024-12-17 PROCEDURE — 1126F AMNT PAIN NOTED NONE PRSNT: CPT | Performed by: PHYSICIAN ASSISTANT

## 2024-12-17 ASSESSMENT — PAIN SCALES - GENERAL: PAINLEVEL_OUTOF10: 0-NO PAIN

## 2024-12-17 NOTE — PROGRESS NOTES
[facility]  Outpatient Clinic Note    Chief Complaint   Patient presents with    recurrent uti     Subjective   Tere Moreira is a 64 y.o. female presenting for follow up recurrent UTI.     History of Present Illness:  Previous urologic workup:  > PVR by US 10/7/24: 16mL    Previous urologic therapy:  > Detrol LA 4mg prescribed by PCP - current   > Estrace vaginal cream 10/7/24 - current  > D Mannose 10/17/24 -  current     At last visit plan was to continue vaginal estrogen cream twice weekly, continue Detrol LA 4 mg, start d-mannose.  10/17/2024 urine culture and microscopic urinalysis negative.  No positive cultures since last visit.  Today she reports she is doing well. Has been asymptomatic in terms of UTI. No change in baseline urinary symptoms. She is taking D-mannose twice daily. Continues with Detrol LA daily. She is using vaginal estrogen cream twice weekly. Denies vaginal bleeding. Denies dysuria, hematuria, fevers, chills, nausea or vomiting. Denies constipation or diarrhea.     Her Pmhx, surgical history, meds, allergies were reviewed and updated as needed.     10/17/24: At last visit patient was started on vaginal estrogen cream for UTI with plan to continue Detrol. 10/7/24 urine culture positive, >100k Ecoli, patient with increased frequency. She was treated with Bactrim DS x 3 days. She presents today for repeat urinalysis and culture as well as treatment discussion.   Today she reports she is doing well. She compelted Bactrim course. Feels her frequency is relatively unchanged. Voiding every 1.5 hours during the day. Relates this to her fluid intake. Denies dysuria. Denies hematuria. Denies vaginal bleeding. Using vaginal estrogen cream without issue.     Initial consult 10/7/24:   Patient referred by her PCP for microscopic hematuria. 8/23/2024 POCT UA notable for trace blood, otherwise negative.  Microscopic urinalysis not performed.  Patient reports a history of UTI, 3 positive  "cultures. She reports she was only symptomatic with 1 of them in May. At that time symptoms included feeling like he had a very bad cold, malodorous urine and frequent urination. Did not experience dysuria. States in August she gave a urine specimen as a part of routine physical, asymptomatic. Same with March urine culture.   Last week she had increased urinary frequency which prompted today's appointment to be moved up to assess for UTI. She reports frequency resolved. Was prescribed Macrobid by PCP but did not start Rx, wanted to wait for today's appointment. Today she denies dysuria, urinary urgency, frequency or dysuria. Denies fevers, chills, nausea, vomiting or flank pain.   Denies gross hematuria. Denies history of kidney stones.   Fluid intake: 48oz tea, 48 oz sparking water, no pop, no alcohol/day. Occasional 1 cup coffee 2x/month.     Reports baseline urinary frequency that started in her 60s. Voiding every 2 hours during the day. Nocturia 0x. Endorses urge incontinence, happens 4-5 days out of the week, typically about 3x/day, depends on how long she delays voiding. Patient prescribed Detrol LA 4mg by PCP. She reports starting this in August. Feels this has improved urgency, can delay voiding longer. Does note dry mouth, not bothersome. \"Slight\" cosntipation.    Denies vaginal prolapse symptoms. Denies splinting. Denies vaginal bleeding. Not currently sexually active.   Had pelvic exam with PCP 8/2/24, pap smear NILM, neg HPV, findings consistent with atrophy.    Has daily bowel movements. Denies fecal incontinence, mentions this has happened before but is not currently an issue.     Past Medical History and Surgical History   Past Medical History:   Diagnosis Date    Chest pain, unspecified 07/23/2019    Left-sided chest pain    Decreased white blood cell count, unspecified 05/01/2019    Leucopenia    Hypertension     Pain in left hip 01/16/2017    Left hip pain    Personal history of other diseases of " the respiratory system 01/08/2020    History of acute sinusitis    Personal history of other endocrine, nutritional and metabolic disease     History of hyperlipidemia    Strain of unspecified muscle, fascia and tendon at shoulder and upper arm level, left arm, initial encounter 01/16/2017    Left shoulder strain     Past Surgical History:   Procedure Laterality Date    ADENOIDECTOMY      OTHER SURGICAL HISTORY  01/28/2020    Appendectomy laparoscopic     Medications  Current Outpatient Medications on File Prior to Visit   Medication Sig Dispense Refill    aspirin 81 mg EC tablet Take 1 tablet (81 mg) by mouth once daily.      atorvastatin (Lipitor) 40 mg tablet TAKE 1 TABLET (40 MG) BY MOUTH ONCE DAILY AT BEDTIME. 90 tablet 1    betamethasone valerate (Valisone) 0.1 % cream Apply topically 2 times a day. Apply topically to affected area on right posterior thigh bid. DO NOT USE ON FACE OR GROIN. 45 g 0    d-mannose 500 mg capsule Take 1 g by mouth 2 times a day. 120 capsule 11    desonide (DesOwen) 0.05 % ointment APPLY TO AFFECTED AREA TWICE A DAY 30 g 2    estradiol (Estrace) 0.01 % (0.1 mg/gram) vaginal cream Insert 1g vaginally twice weekly at bedtime 42.5 g 2    ezetimibe (Zetia) 10 mg tablet TAKE 1 TABLET BY MOUTH EVERY DAY 90 tablet 1    gabapentin (Neurontin) 100 mg capsule Take 1 capsule (100 mg) by mouth once daily at bedtime for 7 days, THEN 2 capsules (200 mg) once daily at bedtime for 7 days, THEN 3 capsules (300 mg) once daily at bedtime for 14 days. 63 capsule 0    hydroCHLOROthiazide (HYDRODiuril) 25 mg tablet 2 tablets (50 mg). (Patient not taking: Reported on 12/17/2024)      ibuprofen 800 mg tablet TAKE 1 TABLET (800 MG) BY MOUTH EVERY 8 HOURS IF NEEDED FOR MILD PAIN (1 - 3) (TAKE WITH FOOD).      ketoconazole (NIZOral) 2 % cream APPLY TO AFFECTED AREA TWICE A DAY 15 g 1    losartan-hydrochlorothiazide (Hyzaar) 50-12.5 mg tablet TAKE 1 TABLET BY MOUTH EVERY DAY 90 tablet 1    tolterodine LA  (Detrol LA) 4 mg 24 hr capsule TAKE 1 CAPSULE (4 MG) BY MOUTH ONCE DAILY. DO NOT CRUSH, CHEW, OR SPLIT. 90 capsule 1     No current facility-administered medications on file prior to visit.       Allergies:  Allergies   Allergen Reactions    Benzoyl Peroxide Hives    Torres Anaphylaxis and Hives     Objective     Physicial Exam  Vitals:    12/17/24 0946   Temp: 35.9 °C (96.6 °F)     Body mass index is 23.02 kg/m².    General: Well developed, well nourished, alert and cooperative, appears in no acute distress  Eyes: Non-injected conjunctiva, sclera clear, no proptosis  Cardiac: Extremities are warm and well perfused. No edema, cyanosis or pallor.   Lungs: Breathing is easy, non-labored. Speaking in clear and complete sentences. Normal diaphragmatic movement.  MSK: Ambulatory with steady gait, unassisted  Neuro: alert and oriented to person, place and time  Psych: Demonstrates good judgement and reason, without hallucinations, abnormal affect or abnormal behaviors.  Skin: no obvious lesions, no rashes.      Chemistry    Lab Results   Component Value Date/Time     10/05/2023 1621    K 4.3 10/05/2023 1621     10/05/2023 1621    CO2 29 10/05/2023 1621    BUN 23 10/05/2023 1621    CREATININE 0.89 10/05/2023 1621    Lab Results   Component Value Date/Time    CALCIUM 9.9 10/05/2023 1621    ALKPHOS 50 10/05/2023 1621    AST 27 10/05/2023 1621    ALT 22 10/05/2023 1621    BILITOT 0.4 10/05/2023 1621        Lab Results   Component Value Date    URINECULTURE No significant growth 10/17/2024    URINECULTURE >100,000 Escherichia coli (A) 10/07/2024    URINECULTURE >100,000 Escherichia coli (A) 08/02/2024    URINECULTURE >100,000 Escherichia coli (A) 05/09/2024    URINECULTURE No significant growth 04/05/2024    URINECULTURE >100,000 Escherichia coli (A) 03/04/2024   10/17/24 microscopic urinalysis negative   8/23/24 POCT UA trace blood, otherwise negative - no urinalysis performed   4/5/24 microscopic urinalysis  negative   3/4/2024 microscopic urinalysis >20 RBC, positive urine culture as above    Review of Systems  All other systems have been reviewed and are negative for complaint.    Assessment and Plan:  Tere Moreira is a 64 y.o. female with microscopic hematuria, recurrent UTI, genitourinary syndrome of menopause. Patient recently finished Bactrim course for UTI, symptom of frequency which has been unchanged s/p treatment.     > Microscopic hematuria: 10/17/24 microscopic urinalysis negative, negative urine culture. No further workup at this time. Will plan to perform annual urinalysis.     > Recurrent UTI:   -Reviewed positive urine cultures as above, 4 in the past year.  Patient reports she was only symptomatic with 1 of these positive cultures. Given patient has been asymptomatic with 3/4 infections, we discussed asymptomatic bacteriuria versus UTI. Discussed would not recommend assessment of urine or for treatment with antibiotics unless patient has change in baseline urinary symptoms. If patient is symptomatic and has positive culture, can discuss addition of methenamine or antibiotic suppression.   - Patient currently asymptomatic, no indication to assess urine today.   - Continue vaginal estrogen cream twice weekly.   - Continue D-mannose 2g daily   - Standing order for urine culture placed previously. If patient symptomatic of UTI, can provide urine sample at any  lab. Will contact patient with results once finalized.     > Genitourinary syndrome of menopause: Continue vaginal estrogen cream twice weekly. Discussed this will likely be a long term medication, reviewed role in UTI prevention and for irritative voiding symptoms.     > Urinary urgency, frequency, urge incontinence: Continue Detrol LA 4mg prescribed by PCP. She feels this is working well for her. Not bothered by side effects enough to discontinue or change medication at this time. Will continue to monitor. If she feels urinary symptoms no  longer well controlled with Detrol, can trial alternate medication.      Follow up: 6-12 months, sooner if needed     All questions and concerns were addressed. Patient verbalizes 3understanding and has no other questions at this time.     Starla Padgett PA-C  12/17/24 10:03 AM  Clinic phone: [phone], [phone]

## 2025-01-13 ENCOUNTER — APPOINTMENT (OUTPATIENT)
Dept: PHYSICAL THERAPY | Facility: CLINIC | Age: 65
End: 2025-01-13
Payer: MEDICARE

## 2025-01-16 ENCOUNTER — APPOINTMENT (OUTPATIENT)
Dept: PRIMARY CARE | Facility: CLINIC | Age: 65
End: 2025-01-16
Payer: COMMERCIAL

## 2025-01-22 ENCOUNTER — APPOINTMENT (OUTPATIENT)
Dept: PHYSICAL THERAPY | Facility: CLINIC | Age: 65
End: 2025-01-22
Payer: MEDICARE

## 2025-01-27 ENCOUNTER — EVALUATION (OUTPATIENT)
Dept: PHYSICAL THERAPY | Facility: CLINIC | Age: 65
End: 2025-01-27
Payer: MEDICARE

## 2025-01-27 DIAGNOSIS — M54.12 CERVICAL NEURITIS: ICD-10-CM

## 2025-01-27 DIAGNOSIS — M54.16 LUMBAR RADICULOPATHY: ICD-10-CM

## 2025-01-27 DIAGNOSIS — M54.81 BILATERAL OCCIPITAL NEURALGIA: ICD-10-CM

## 2025-01-27 DIAGNOSIS — I10 ESSENTIAL (PRIMARY) HYPERTENSION: ICD-10-CM

## 2025-01-27 PROCEDURE — 97161 PT EVAL LOW COMPLEX 20 MIN: CPT | Mod: GP

## 2025-01-27 PROCEDURE — 97535 SELF CARE MNGMENT TRAINING: CPT | Mod: GP

## 2025-01-27 PROCEDURE — 97110 THERAPEUTIC EXERCISES: CPT | Mod: GP

## 2025-01-27 RX ORDER — LOSARTAN POTASSIUM AND HYDROCHLOROTHIAZIDE 12.5; 5 MG/1; MG/1
1 TABLET ORAL DAILY
Qty: 90 TABLET | Refills: 1 | Status: SHIPPED | OUTPATIENT
Start: 2025-01-27

## 2025-01-27 ASSESSMENT — ENCOUNTER SYMPTOMS
LOSS OF SENSATION IN FEET: 0
DEPRESSION: 0
OCCASIONAL FEELINGS OF UNSTEADINESS: 0

## 2025-01-27 NOTE — PROGRESS NOTES
Physical Therapy  Physical Therapy Orthopedic Evaluation    Patient Name: Teer Moreira  MRN: [id number]  Today's Date: 2025  Time Calculation  Start Time: 1236  Stop Time: 1338  Time Calculation (min): 62 min    Insurance:  Visit [place]: 1 of [place]  Authorization info: No Auth  Insurance Type: Aetna Medicare  Medicare Certification Period: Beginnin2025 Endin2025  Onset date: 2024    General:  Reason for visit: Lumbar Radiculopathy, Cervical Neuritis  Referred by: Kmi Sargent MD, PhD    Current Problem  1. Bilateral occipital neuralgia  Referral to Physical Therapy    Follow Up In Physical Therapy      2. Cervical neuritis  Referral to Physical Therapy    Follow Up In Physical Therapy      3. Lumbar radiculopathy  Referral to Physical Therapy    Follow Up In Physical Therapy          Precautions:   Precautions  STEADI Fall Risk Score (The score of 4 or more indicates an increased risk of falling): 1    Medical History Form: Reviewed (scanned into chart)    Subjective:   Subjective   Chief Complaint: Patient presents to clinic with primary complaint of upper cervical pain.  Symptoms were first noticed in May 2024.  In early October, symptoms became more apparent. Reports upper cervical pain with intermittent radiation to eye region.  Very seldom radiation into the R arm.  Symptoms are most severe first thing in the morning upon awakening and ease over the first hour of being up and moving.  Symptoms improved once she stopped using a memory foam pillow.  She now often sleeps without a pillow.      Patient also reports R sciatic symptoms for which she was also referred to PT, but these symptoms have improved considerably.     Onset Date: 2024  KRISTEN: Insidious    Current Condition:   Same    Pain:  Pain level currently: 0/10  Pain level at worst: 10/10  Pain level at best: 0/10  Location: Base of occiput and upper neck region.  Can refer to orbital region.   Description: Ache,  pressure headache, mild brain fog. Limited cervical ROM.  Aggravating Factors: Time of day (1st thing in morning). Cervical rotation. Lifting. Prolonged sitting (sciatic pain)   Relieving Factors:  Sleeping without a pillow     Functional Limitations: Pain with lifting    Prior Level of Function (PLOF)  Patient previously independent with all ADLs  Exercise/Physical Activity: Boot camp class (free weights with some overhead lifting, TRX), walking 2-3 miles per day (was walking with a weighted vest until recently)  Work/School: Retired Teacher  R hand dominant  Sleeping posture: Sleeps on back and stomach.  Unable to sleep on her side due to sciatic symptoms.     Relevant Information:  PMH: HTN, High Cholesterol, UTIs  Previous Tests/Imaging: xrays back and neck demonstrate degenerative changes  Previous Interventions/Treatments: None    Patients Living Environment: Reviewed and no concern    Primary Language: English    Patient's Goal(s) for Therapy: Alleviate neck pain    Red Flags: Do you have any of the following? No  Fever/chills, unexplained weight changes, dizziness/fainting, unexplained change in bowel or bladder functions, unexplained malaise or muscle weakness, night pain/sweats, numbness or tingling    Objective:  Objective     Posture: Mild FH, holds head slightly side-bent to the right    Palpation: Increased tone L UT and levator scap; tender suboccipital region R and L; mild tenderness bilateral SCM        ROM    Cervical AROM (Degrees)    Flexion: 49  Extension: 58, Pressure at base of kee  (L) Side Bend: 23  (R) Side Bend: 35  (L) Rotation: 60  (R) Rotation: 65    (L) Upper cervical Rotation: 40  (R) Upper cervical Rotation: 45      Shoulder AROM (Degrees)      (R)  (L)  Flexion: 155  153   Abduction: 156  156    Functional ER: 58  53    Functional IR: T5  T4        Strength Testing    Shoulder    (R)  (L)  Flexion: 4+  5     Abduction: 4  4+    ER:  4  4+    IR:  5  5        Periscapular  Musculature:    (R)  (L)  Upper Traps: 5  5    Middle Traps NT  NT    Lower Traps: NT  NT        Neuro:    Dermatomes: Intact to light touch       Special Tests:  (+) Craniocervical flexion test     Outcome Measures:  Other Measures  Neck Disability Index: 12%       Goals: Set and discussed today  Active       PT Problem       PT Goal 1       Start:  01/27/25    Expected End:  02/17/25       Demonstrate home exercise program adherence for management of symptoms.          PT Goal 2       Start:  01/27/25    Expected End:  03/24/25       Cervical AROM WNL and without pain >1-2/10.  Bilateral shoulder strength measures of at least 4+/5.  Demonstrate good maintenance of neutral postural alignment throughout treatment session.  Sleep without interruption from neck pain.  Decrease headache frequency from daily to 2x/week or less.  Patient will rate pain < 4/10 at worst to improve ADL tolerance.  Independent with home exercise program for symptom reduction and functional gains.              Plan of care was developed with input and agreement by the patient      Treatment Performed:    Therapeutic Exercise:    15 min  Instructed in personalized Home Exercise program  Access Code: [email or web address]  URL: [email or web address]  Date: 01/27/2025  Prepared by: Azalea Bermeo    Exercises  - Supine Cervical Retraction with Towel  - 1-2 x daily - 7 x weekly - 10 reps - 3 seconds hold  - Seated Cervical Retraction and Rotation  - 2 x daily - 7 x weekly - 10 reps  - Seated Cervical Sidebending AROM  - 2 x daily - 7 x weekly - 10 reps  - Seated Levator Scapulae Stretch  - 2 x daily - 7 x weekly - 3 reps - 20 seconds hold  - Seated Scapular Retraction  - 2 x daily - 7 x weekly - 20 reps - 5 seconds hold  - Standing Shoulder Horizontal Abduction with Resistance  - 3-4 x weekly - 3 sets - 10 reps    Self Care/Education:     15 min  Evaluation findings  Pertinent upper cervical anatomy and pathology  Plan of  care  Seated and sleeping postures.  Discussed use of cervical roll.  Proper hydration    Charges: LC Eval x 1, TE x 1, Self-care x 1    Assessment: Patient presents to PT with signs and symptoms consistent with cervicogenic headaches.  Upon examination, patient demonstrates FH posture, myofascial tenderness/tension, reduced AROM cervical spine, and weakness in postural stabilizers.  Patient would benefit from course of PT to address these impairments, reduce pain, and assist patient in returning to [facility].      Clinical Presentation: Stable and/or uncomplicated characteristics  Level of Complexity: Low      Plan:     Planned Interventions include: therapeutic exercise, self-care home management, manual therapy, therapeutic activities, neuromuscular coordination, dry needling  Frequency: 1 x Week  Duration: 8 Weeks  Rehab Potential/Prognosis: Chavo Bermeo, PT

## 2025-02-03 ENCOUNTER — TREATMENT (OUTPATIENT)
Dept: PHYSICAL THERAPY | Facility: CLINIC | Age: 65
End: 2025-02-03
Payer: MEDICARE

## 2025-02-03 DIAGNOSIS — M54.16 LUMBAR RADICULOPATHY: ICD-10-CM

## 2025-02-03 DIAGNOSIS — M54.81 BILATERAL OCCIPITAL NEURALGIA: ICD-10-CM

## 2025-02-03 DIAGNOSIS — M54.12 CERVICAL NEURITIS: ICD-10-CM

## 2025-02-03 PROCEDURE — 97140 MANUAL THERAPY 1/> REGIONS: CPT | Mod: GP

## 2025-02-03 PROCEDURE — 97110 THERAPEUTIC EXERCISES: CPT | Mod: GP

## 2025-02-03 ASSESSMENT — PAIN SCALES - GENERAL: PAINLEVEL_OUTOF10: 2

## 2025-02-03 ASSESSMENT — PAIN - FUNCTIONAL ASSESSMENT: PAIN_FUNCTIONAL_ASSESSMENT: 0-10

## 2025-02-03 NOTE — PROGRESS NOTES
Physical Therapy  Physical Therapy Treatment Note    Patient Name: Tere Moreira  MRN: [id number]  Today's Date: 2/3/2025  Time Calculation  Start Time: 0750  Stop Time: 0837  Time Calculation (min): 47 min    Insurance:  Visit number: 2 of [place]  Authorization info: No Auth  Insurance Type: Aetna Medicare  Medicare Certification Period: Beginnin2025 Endin2025  Onset date: 2024     General:  Reason for visit: Lumbar Radiculopathy, Cervical Neuritis  Referred by: Kim Sargent MD, PhD    Current Problem  1. Bilateral occipital neuralgia  Follow Up In Physical Therapy      2. Cervical neuritis  Follow Up In Physical Therapy      3. Lumbar radiculopathy  Follow Up In Physical Therapy          Precautions: STEADI Fall Risk Score (The score of 4 or more indicates an increased risk of falling): 1       Subjective:     Patient reports that the exercises are fine.  Notes limited motion with the rotation and sidebending exercises. Feels pressure/referred pain in the lateral head and eye region with cervical rotation right.     Pain  Pain Assessment: 0-10  0-10 (Numeric) Pain Score: 2    Performing HEP?: Yes      Objective:   Palpation: (-) tenderness to the SCM bilaterally      Treatment Performed:    Therapeutic Exercise:    22 min  UBE 2.5' F/2.5' R  Supine cervical retraction 2x10  Supine cervical rotation 2x10 R/L  Supine shoulder horizontal abduction 2x10, green Tband  Supine D2 green tband 2x10 R/L  Supine pec major stretch 1'  Sidelying open book 5x R/L    Manual Therapy:    25 min  STM to suboccipitals  Brief cervical distraction  PROM cervical spine Rotation and SB  Passive stretching R and L UT, Levator scapulae, and pec minor  C2 lateral glide with patient in sidelying  Percussive massage to bilateral rhomboid, levotor scapulae, and UT    Other:       Discussed dry needling.  Issued informative paperwork and AVN      Personalized Home Exercise Program:  Access Code: [email or web address]  URL:  [email or web address]  Date: 01/27/2025  Prepared by: Azalea Bermeo     Exercises  - Supine Cervical Retraction with Towel  - 1-2 x daily - 7 x weekly - 10 reps - 3 seconds hold  - Seated Cervical Retraction and Rotation  - 2 x daily - 7 x weekly - 10 reps  - Seated Cervical Sidebending AROM  - 2 x daily - 7 x weekly - 10 reps  - Seated Levator Scapulae Stretch  - 2 x daily - 7 x weekly - 3 reps - 20 seconds hold  - Seated Scapular Retraction  - 2 x daily - 7 x weekly - 20 reps - 5 seconds hold  - Standing Shoulder Horizontal Abduction with Resistance  - 3-4 x weekly - 3 sets - 10 reps     Charges: Man x 2, TE x 1    Assessment:   Noted increased comfort and ease of movement following treatment session today.  Demonstrated improvement in cervical rotation ROM.       Plan:  Continue with manual interventions and exercises for improvements in soft tissue and joint ROM and reduction in neck pain and headaches.  May proceed with dry needling to cervical region next session.       Azalea Bermeo, PT

## 2025-02-21 ENCOUNTER — TREATMENT (OUTPATIENT)
Dept: PHYSICAL THERAPY | Facility: CLINIC | Age: 65
End: 2025-02-21
Payer: MEDICARE

## 2025-02-21 DIAGNOSIS — M54.81 BILATERAL OCCIPITAL NEURALGIA: ICD-10-CM

## 2025-02-21 DIAGNOSIS — M54.16 LUMBAR RADICULOPATHY: ICD-10-CM

## 2025-02-21 DIAGNOSIS — M54.12 CERVICAL NEURITIS: ICD-10-CM

## 2025-02-21 PROCEDURE — 97140 MANUAL THERAPY 1/> REGIONS: CPT | Mod: GP

## 2025-02-21 PROCEDURE — 97110 THERAPEUTIC EXERCISES: CPT | Mod: GP

## 2025-02-21 ASSESSMENT — PAIN SCALES - GENERAL: PAINLEVEL_OUTOF10: 0 - NO PAIN

## 2025-02-21 NOTE — PROGRESS NOTES
Physical Therapy  Physical Therapy Treatment Note    Patient Name: Tere Moreira  MRN: [id number]  Today's Date: 2025  Time Calculation  Start Time: 1104  Stop Time: 1158  Time Calculation (min): 54 min    Insurance:  Visit number: 3 of [place]  Authorization info: No Auth  Insurance Type: Aetna Medicare  Medicare Certification Period: Beginnin2025 Endin2025  Onset date: 2024     General:  Reason for visit: Lumbar Radiculopathy, Cervical Neuritis  Referred by: Kim Sargent MD, PhD    Current Problem  1. Bilateral occipital neuralgia  Follow Up In Physical Therapy      2. Cervical neuritis  Follow Up In Physical Therapy      3. Lumbar radiculopathy  Follow Up In Physical Therapy            Precautions: NORBERTADI Fall Risk Score (The score of 4 or more indicates an increased risk of falling): 1       Subjective:     Patient reports that she purchased a new contoured pillow and it seems to be helping.  Waking up with less  of a headache and has more ROM in the neck.     Pain  0-10 (Numeric) Pain Score: 0 - No pain    Performing HEP?: Yes      Objective:   Palpation: (-) tenderness to the SCM bilaterally    Mild hike of scapula on R with elevation of R arm    Slight lag with R shoulder ER Lag Sign 2    Treatment Performed:    Therapeutic Exercise:    44 min  UBE 2.5' F/2.5' R  Mid Row 5# 3x10  Low Row 5# 3x10  Shoulder horizontal abduction Green Tband 3x10  Shoulder ER with scapular retraction Red tband 3x10  Supine scapular protraction 5# 3x10  Supine cervical retraction 2x10  Supine cervical rotation 2x10 R/L  Supine pec major stretch 1'  Sidelying open book 10x R/L    Manual Therapy:    10 min  STM to suboccipitals  Brief cervical distraction  PROM cervical spine Rotation and SB  Passive stretching R and L UT, Levator scapulae, and pec minor  C2 lateral glide with patient in supine        Personalized Home Exercise Program:  Access Code: [email or web address]  URL:  [email or web address]  Date: 02/21/2025  Prepared by: Azalea Bermeo    Exercises  - Supine Cervical Retraction with Towel  - 1-2 x daily - 7 x weekly - 10 reps - 3 seconds hold  - Sidelying Open Book Thoracic Lumbar Rotation and Extension  - 1 x daily - 7 x weekly - 2 sets - 10 reps  - Supine Pectoralis Stretch  - 1 x daily - 7 x weekly - 3 reps - 30 seconds hold  - Seated Cervical Retraction and Rotation  - 2 x daily - 7 x weekly - 10 reps  - Seated Cervical Sidebending AROM  - 2 x daily - 7 x weekly - 10 reps  - Seated Levator Scapulae Stretch  - 2 x daily - 7 x weekly - 3 reps - 20 seconds hold  - Seated Scapular Retraction  - 2 x daily - 7 x weekly - 20 reps - 5 seconds hold  - Standing Shoulder Horizontal Abduction with Resistance  - 3-4 x weekly - 3 sets - 10 reps  - Standing Shoulder External Rotation with Resistance  - 3-4 x weekly - 3 sets - 10 reps     Charges: Man x 2, TE x 1    Assessment:   Notable challenge with resisted shoulder ER. Particularly weak on the R side with demonstration of scapular hike with R UE elevation. Will incorporate additional RTC strengthening into program.  Overall, progressing nicely with reduced headache frequency and intensity and improved cervical ROM.       Plan:  Continue with manual interventions and exercises for improvements in soft tissue and joint ROM and reduction in neck pain and headaches.  RTC and scapular strengthening. May proceed with dry needling to cervical region next session.       Azalea Bermeo, PT

## 2025-02-24 ENCOUNTER — TREATMENT (OUTPATIENT)
Dept: PHYSICAL THERAPY | Facility: CLINIC | Age: 65
End: 2025-02-24
Payer: MEDICARE

## 2025-02-24 DIAGNOSIS — M54.16 LUMBAR RADICULOPATHY: ICD-10-CM

## 2025-02-24 DIAGNOSIS — M54.81 BILATERAL OCCIPITAL NEURALGIA: Primary | ICD-10-CM

## 2025-02-24 DIAGNOSIS — M54.12 CERVICAL NEURITIS: ICD-10-CM

## 2025-02-24 PROCEDURE — 97110 THERAPEUTIC EXERCISES: CPT | Mod: GP

## 2025-02-24 PROCEDURE — 97140 MANUAL THERAPY 1/> REGIONS: CPT | Mod: GP

## 2025-02-24 ASSESSMENT — PAIN SCALES - GENERAL: PAINLEVEL_OUTOF10: 0 - NO PAIN

## 2025-02-24 NOTE — PROGRESS NOTES
"  Physical Therapy  Physical Therapy Treatment Note    Patient Name: Tere Moreira  MRN: [id number]  Today's Date: 2025  Time Calculation  Start Time: 1320  Stop Time: 1405  Time Calculation (min): 45 min    Insurance:  Visit number: 4 [facility]  Authorization info: No Auth  Insurance Type: Aetna Medicare  Medicare Certification Period: Beginnin2025 Endin2025  Onset date: 2024     General:  Reason for visit: Lumbar Radiculopathy, Cervical Neuritis  Referred by: Kim Sargent MD, PhD    Current Problem  1. Bilateral occipital neuralgia  Follow Up In Physical Therapy      2. Cervical neuritis  Follow Up In Physical Therapy      3. Lumbar radiculopathy  Follow Up In Physical Therapy              Precautions: STEADI Fall Risk Score (The score of 4 or more indicates an increased risk of falling): 1       Subjective:     Patient reports that \"it feels ok.\"  Notes improved ROM.  Challenged with new exercise for the R shoulder.  It feels weak.  Still using the new pillow and it seems helpful.  Felt fine after last session.     Pain  0-10 (Numeric) Pain Score: 0 - No pain    Performing HEP?: Yes      Objective:   Palpation: (-) tenderness to the SCM bilaterally    Mild hike of scapula on R with elevation of R arm    Slight lag with R shoulder ER Lag Sign 2    Treatment Performed:    Therapeutic Exercise:    35 min  UBE 2.5' F/2.5' R  Mid Row 5# 3x10  Low Row 5# 3x10  Shoulder horizontal abduction Green Tband 3x10  Shoulder ER with scapular retraction Red tband 3x10  Standing shoulder flexion to 90 degrees 0# R, 2# L 2x10  Standing shoulder scaption to 90 degrees 1# R 2# L 2x10  Supine scapular protraction 5# 3x10  Supine cervical retraction 2x10--not performed  Supine cervical rotation 2x10 R/L--not performed  Supine pec major stretch 1'  Sidelying open book 5x R/L    Manual Therapy:    10 min  Brief cervical distraction  PROM cervical spine Rotation and SB  Passive stretching R and L UT, Levator " scapulae, and pec minor  C2 lateral glide with patient in supine     Personalized Home Exercise Program:  Access Code: [email or web address]  URL: [email or web address]  Date: 02/21/2025  Prepared by: Azalea Bermeo    Exercises  - Supine Cervical Retraction with Towel  - 1-2 x daily - 7 x weekly - 10 reps - 3 seconds hold  - Sidelying Open Book Thoracic Lumbar Rotation and Extension  - 1 x daily - 7 x weekly - 2 sets - 10 reps  - Supine Pectoralis Stretch  - 1 x daily - 7 x weekly - 3 reps - 30 seconds hold  - Seated Cervical Retraction and Rotation  - 2 x daily - 7 x weekly - 10 reps  - Seated Cervical Sidebending AROM  - 2 x daily - 7 x weekly - 10 reps  - Seated Levator Scapulae Stretch  - 2 x daily - 7 x weekly - 3 reps - 20 seconds hold  - Seated Scapular Retraction  - 2 x daily - 7 x weekly - 20 reps - 5 seconds hold  - Standing Shoulder Horizontal Abduction with Resistance  - 3-4 x weekly - 3 sets - 10 reps  - Standing Shoulder External Rotation with Resistance  - 3-4 x weekly - 3 sets - 10 reps     Charges: Man x 2, TE x 1    Assessment:   Continues to note improvements in cervical symptoms and in ROM.  Added new shoulder strengthening exercises to address RTC weakness.  Performed without pain, but had to start with 0-1# to avoid scapular hiking.  Overall, progressing nicely with reduced headache frequency and intensity and improved cervical ROM.       Plan:  Continue with manual interventions and exercises for improvements in soft tissue and joint ROM and reduction in neck pain and headaches.  RTC and scapular strengthening. May proceed with dry needling to cervical region next session.       Azalea Bermeo, PT

## 2025-02-26 ENCOUNTER — TELEPHONE (OUTPATIENT)
Dept: PRIMARY CARE | Facility: CLINIC | Age: 65
End: 2025-02-26
Payer: MEDICARE

## 2025-03-03 ENCOUNTER — APPOINTMENT (OUTPATIENT)
Dept: PHYSICAL THERAPY | Facility: CLINIC | Age: 65
End: 2025-03-03
Payer: MEDICARE

## 2025-03-04 ENCOUNTER — APPOINTMENT (OUTPATIENT)
Dept: PRIMARY CARE | Facility: CLINIC | Age: 65
End: 2025-03-04
Payer: MEDICARE

## 2025-03-04 ENCOUNTER — HOSPITAL ENCOUNTER (OUTPATIENT)
Dept: RADIOLOGY | Facility: CLINIC | Age: 65
Discharge: HOME | End: 2025-03-04
Payer: MEDICARE

## 2025-03-04 VITALS
TEMPERATURE: 97.1 F | BODY MASS INDEX: 23.18 KG/M2 | HEART RATE: 94 BPM | DIASTOLIC BLOOD PRESSURE: 85 MMHG | WEIGHT: 148 LBS | SYSTOLIC BLOOD PRESSURE: 126 MMHG

## 2025-03-04 DIAGNOSIS — R20.2 RIGHT HAND PARESTHESIA: ICD-10-CM

## 2025-03-04 DIAGNOSIS — M54.41 CHRONIC BILATERAL LOW BACK PAIN WITH BILATERAL SCIATICA: ICD-10-CM

## 2025-03-04 DIAGNOSIS — M25.552 BILATERAL HIP PAIN: Primary | ICD-10-CM

## 2025-03-04 DIAGNOSIS — M25.552 BILATERAL HIP PAIN: ICD-10-CM

## 2025-03-04 DIAGNOSIS — M25.551 BILATERAL HIP PAIN: ICD-10-CM

## 2025-03-04 DIAGNOSIS — M54.42 CHRONIC BILATERAL LOW BACK PAIN WITH BILATERAL SCIATICA: ICD-10-CM

## 2025-03-04 DIAGNOSIS — G89.29 CHRONIC BILATERAL LOW BACK PAIN WITH BILATERAL SCIATICA: ICD-10-CM

## 2025-03-04 DIAGNOSIS — M25.551 BILATERAL HIP PAIN: Primary | ICD-10-CM

## 2025-03-04 PROCEDURE — G2211 COMPLEX E/M VISIT ADD ON: HCPCS | Performed by: FAMILY MEDICINE

## 2025-03-04 PROCEDURE — 3079F DIAST BP 80-89 MM HG: CPT | Performed by: FAMILY MEDICINE

## 2025-03-04 PROCEDURE — 1159F MED LIST DOCD IN RCRD: CPT | Performed by: FAMILY MEDICINE

## 2025-03-04 PROCEDURE — 1158F ADVNC CARE PLAN TLK DOCD: CPT | Performed by: FAMILY MEDICINE

## 2025-03-04 PROCEDURE — 3074F SYST BP LT 130 MM HG: CPT | Performed by: FAMILY MEDICINE

## 2025-03-04 PROCEDURE — 1123F ACP DISCUSS/DSCN MKR DOCD: CPT | Performed by: FAMILY MEDICINE

## 2025-03-04 PROCEDURE — 1036F TOBACCO NON-USER: CPT | Performed by: FAMILY MEDICINE

## 2025-03-04 PROCEDURE — 73522 X-RAY EXAM HIPS BI 3-4 VIEWS: CPT

## 2025-03-04 PROCEDURE — 99214 OFFICE O/P EST MOD 30 MIN: CPT | Performed by: FAMILY MEDICINE

## 2025-03-04 PROCEDURE — 1126F AMNT PAIN NOTED NONE PRSNT: CPT | Performed by: FAMILY MEDICINE

## 2025-03-04 ASSESSMENT — PAIN SCALES - GENERAL: PAINLEVEL_OUTOF10: 0-NO PAIN

## 2025-03-04 NOTE — PROGRESS NOTES
Subjective   Patient ID: Tere Moreira is a 65 y.o. female who presents for Follow-up.  HPI  66 yo AA female with a history of HLP,HTN,  here for the management of :    1- bilateral sciatica occasionally exacerbated.  Associated to a low back occasional pain.   2- paresthesia of right hand.  She has DJD of the neck currently treated with PT.   3- bilateral hip stiffness.    A review of system was completed.  All systems were reviewed and were normal, except for the ones that are listed in the HPI.    Objective   Physical Exam  Constitutional:       Appearance: Normal appearance.   HENT:      Head: Normocephalic and atraumatic.      Right Ear: Tympanic membrane, ear canal and external ear normal.      Left Ear: Tympanic membrane, ear canal and external ear normal.      Nose: Nose normal.      Mouth/Throat:      Mouth: Mucous membranes are moist.      Pharynx: Oropharynx is clear.   Eyes:      Extraocular Movements: Extraocular movements intact.      Conjunctiva/sclera: Conjunctivae normal.      Pupils: Pupils are equal, round, and reactive to light.   Cardiovascular:      Rate and Rhythm: Normal rate and regular rhythm.      Pulses: Normal pulses.   Pulmonary:      Effort: Pulmonary effort is normal.      Breath sounds: Normal breath sounds.   Abdominal:      General: Abdomen is flat. Bowel sounds are normal.      Palpations: Abdomen is soft.   Musculoskeletal:         General: Normal range of motion.      Cervical back: Normal range of motion and neck supple.   Skin:     General: Skin is warm.   Neurological:      General: No focal deficit present.      Mental Status: She is alert and oriented to person, place, and time. Mental status is at baseline.   Psychiatric:         Mood and Affect: Mood normal.         Behavior: Behavior normal.         Thought Content: Thought content normal.         Judgment: Judgment normal.         Assessment/Plan   Problem List Items Addressed This Visit       Bilateral hip pain -  Primary    Relevant Orders    XR hips bilateral 3 or 4 VW w pelvis when performed    Referral to Physical Therapy    Right hand paresthesia    Relevant Orders    XR hips bilateral 3 or 4 VW w pelvis when performed    Referral to Physical Therapy    Chronic bilateral low back pain with bilateral sciatica    Relevant Orders    XR hips bilateral 3 or 4 VW w pelvis when performed    Referral to Physical Therapy    Patient to return to office in 6 weeks.

## 2025-03-06 ENCOUNTER — TREATMENT (OUTPATIENT)
Dept: PHYSICAL THERAPY | Facility: CLINIC | Age: 65
End: 2025-03-06
Payer: MEDICARE

## 2025-03-06 DIAGNOSIS — M54.16 LUMBAR RADICULOPATHY: ICD-10-CM

## 2025-03-06 DIAGNOSIS — M54.12 CERVICAL NEURITIS: ICD-10-CM

## 2025-03-06 DIAGNOSIS — M54.81 BILATERAL OCCIPITAL NEURALGIA: ICD-10-CM

## 2025-03-06 DIAGNOSIS — M25.552 BILATERAL HIP PAIN: Primary | ICD-10-CM

## 2025-03-06 DIAGNOSIS — M25.551 BILATERAL HIP PAIN: Primary | ICD-10-CM

## 2025-03-06 PROCEDURE — 20560 NDL INSJ W/O NJX 1 OR 2 MUSC: CPT | Mod: GP

## 2025-03-06 PROCEDURE — 97140 MANUAL THERAPY 1/> REGIONS: CPT | Mod: GP

## 2025-03-06 PROCEDURE — 97110 THERAPEUTIC EXERCISES: CPT | Mod: GP

## 2025-03-06 NOTE — RESULT ENCOUNTER NOTE
Patient's bilateral hip x-rays showed signs of some anatomic variation of her hip joints.  There was also signs of mild arthritis.  We recommend a referral to orthopedic surgeon for further management.  Referral order was placed in the system.

## 2025-03-06 NOTE — PROGRESS NOTES
Physical Therapy  Physical Therapy Treatment Note    Patient Name: Tere Moreira  MRN: [id number]  Today's Date: 3/6/2025  Time Calculation  Start Time: 1450  Stop Time: 1540  Time Calculation (min): 50 min    Insurance:  Visit number: 5 [facility]  Authorization info: No Auth  Insurance Type: Aetna Medicare  Medicare Certification Period: Beginnin2025 Endin2025  Onset date: 2024     General:  Reason for visit: Lumbar Radiculopathy, Cervical Neuritis  Referred by: Kim Sargent MD, PhD    Current Problem  1. Bilateral occipital neuralgia  Follow Up In Physical Therapy      2. Cervical neuritis  Follow Up In Physical Therapy      3. Lumbar radiculopathy  Follow Up In Physical Therapy                Precautions: STEADI Fall Risk Score (The score of 4 or more indicates an increased risk of falling): 1       Subjective:     Patient reports that  she mildly strained her right shoulder with ropes at the gym.  Sore now as a result, but not painful.     Pain  0/10    Performing HEP?: Yes      Objective:   Palpation: (-) tenderness to the SCM bilaterally    Mild hike of scapula on R with elevation of R arm    Slight lag with R shoulder ER Lag Sign 2    Treatment Performed:    Therapeutic Exercise:    20 min  UBE 2.5' F/2.5' R  Mid Row 5# 3x10  Low Row 5# 3x10  Shoulder horizontal abduction red Tband 3x10  Shoulder ER with scapular retraction Red tband 3x10  Standing shoulder flexion to 90 degrees 1# R/L 2x10  Standing shoulder scaption to 90 degrees 1# R/L 2x10    Not performed:  Supine scapular protraction 5# 3x10  Supine cervical retraction 2x10  Supine cervical rotation 2x10 R/L  Supine pec major stretch 1'  Sidelying open book 5x R/L    Manual Therapy:    15 min  STM to bilateral suboccipitals, cervical multifidi, and upper trap  P-A mobilization to C7-T3 segments  C2 lateral glide with patient    Not performed:  Brief cervical distraction  PROM cervical spine Rotation and SB  Passive stretching  R and L UT, Levator scapulae, and pec minor    Dry Needling    15 min:  Patient was prepped with 70% isopropyl alcohol.    Trigger point needling was performed to the following muscles: bilateral sub-occipitals, cervical multifidi, bilateral UT  Sterile single use solid filament needles were inserted at various depths and angles to release soft tissue and improve microcirculation via a myofascial twitch response.         Personalized Home Exercise Program:  Access Code: [email or web address]  URL: [email or web address]  Date: 02/21/2025  Prepared by: Azalea Bermeo    Exercises  - Supine Cervical Retraction with Towel  - 1-2 x daily - 7 x weekly - 10 reps - 3 seconds hold  - Sidelying Open Book Thoracic Lumbar Rotation and Extension  - 1 x daily - 7 x weekly - 2 sets - 10 reps  - Supine Pectoralis Stretch  - 1 x daily - 7 x weekly - 3 reps - 30 seconds hold  - Seated Cervical Retraction and Rotation  - 2 x daily - 7 x weekly - 10 reps  - Seated Cervical Sidebending AROM  - 2 x daily - 7 x weekly - 10 reps  - Seated Levator Scapulae Stretch  - 2 x daily - 7 x weekly - 3 reps - 20 seconds hold  - Seated Scapular Retraction  - 2 x daily - 7 x weekly - 20 reps - 5 seconds hold  - Standing Shoulder Horizontal Abduction with Resistance  - 3-4 x weekly - 3 sets - 10 reps  - Standing Shoulder External Rotation with Resistance  - 3-4 x weekly - 3 sets - 10 reps     Charges: Man x 1, DN x 1, TE x 1    Assessment:   Continues to note improvements in cervical symptoms and in ROM.  Dry needling performed today with positive response and without complications.     Plan:  Continue with manual interventions, dry needling, and exercises for improvements in soft tissue and joint ROM and reduction in neck pain and headaches.  RTC and scapular strengthening.     Azalea Bermeo, PT

## 2025-03-07 ENCOUNTER — TELEPHONE (OUTPATIENT)
Dept: PRIMARY CARE | Facility: CLINIC | Age: 65
End: 2025-03-07
Payer: MEDICARE

## 2025-03-19 ENCOUNTER — TREATMENT (OUTPATIENT)
Dept: PHYSICAL THERAPY | Facility: CLINIC | Age: 65
End: 2025-03-19
Payer: MEDICARE

## 2025-03-19 DIAGNOSIS — M54.16 LUMBAR RADICULOPATHY: ICD-10-CM

## 2025-03-19 DIAGNOSIS — M54.12 CERVICAL NEURITIS: ICD-10-CM

## 2025-03-19 DIAGNOSIS — M54.81 BILATERAL OCCIPITAL NEURALGIA: Primary | ICD-10-CM

## 2025-03-19 PROCEDURE — 20560 NDL INSJ W/O NJX 1 OR 2 MUSC: CPT | Mod: GP

## 2025-03-19 PROCEDURE — 97140 MANUAL THERAPY 1/> REGIONS: CPT | Mod: GP

## 2025-03-19 NOTE — PROGRESS NOTES
Physical Therapy  Physical Therapy Treatment Note    Patient Name: Tere Moreira  MRN: [id number]  Today's Date: 3/19/2025  Time Calculation  Start Time: 1011  Stop Time: 1053  Time Calculation (min): 42 min    Insurance:  Visit number: [place]  Authorization info: No Auth  Insurance Type: A[employer] Medicare  Medicare Certification Period: Beginnin2025 Endin2025  Onset date: 2024     General:  Reason for visit: Lumbar Radiculopathy, Cervical Neuritis  Referred by: Kim Sargent MD, PhD    Current Problem  1. Bilateral occipital neuralgia  Follow Up In Physical Therapy      2. Cervical neuritis  Follow Up In Physical Therapy      3. Lumbar radiculopathy  Follow Up In Physical Therapy              Precautions: TYRELL Fall Risk Score (The score of 4 or more indicates an increased risk of falling): 1       Subjective:     Patient reports that she feels pretty good.  Symptoms are impacted by sleep position.  Fell asleep with the neck side-bent and woke up very stiff.  Motion is good, but has some pain currently with rotation right.  Has been doing exercises consistently and making some modifications at the gym.     Notes that she did not notice a big change after the dry needling last session.      Pain  0/10    Performing HEP?: Yes      Objective:   Palpation: (-) tenderness to the SCM bilaterally    Mild hike of scapula on R with elevation of R arm    Slight lag with R shoulder ER Lag Sign 2    Cervical AROM (Degrees)     Flexion: WNL  Extension: Mild paz  (L) Rotation: Mild paz  (R) Rotation: Mild paz    Treatment Performed:    Manual Therapy:    27 min  STM to bilateral suboccipitals, cervical multifidi, levator scapulae, upper trap, and proximal SCM  TrP release L Levator scap and R subocciptials  P-A mobilization to C7-T3 segments  C2 lateral glides   Cervical distraction  PROM cervical spine Rotation and SB  Passive stretching R and L UT, Levator scapulae    Dry Needling    15  min:  Patient was prepped with 70% isopropyl alcohol.    Trigger point needling was performed to the following muscles: bilateral sub-occipitals, cervical multifidi, bilateral UT  Sterile single use solid filament needles were inserted at various depths and angles to release soft tissue and improve microcirculation via a myofascial twitch response.         Personalized Home Exercise Program:  Access Code: [email or web address]  URL: [email or web address]  Date: 02/21/2025  Prepared by: Azalea Bermeo    Exercises  - Supine Cervical Retraction with Towel  - 1-2 x daily - 7 x weekly - 10 reps - 3 seconds hold  - Sidelying Open Book Thoracic Lumbar Rotation and Extension  - 1 x daily - 7 x weekly - 2 sets - 10 reps  - Supine Pectoralis Stretch  - 1 x daily - 7 x weekly - 3 reps - 30 seconds hold  - Seated Cervical Retraction and Rotation  - 2 x daily - 7 x weekly - 10 reps  - Seated Cervical Sidebending AROM  - 2 x daily - 7 x weekly - 10 reps  - Seated Levator Scapulae Stretch  - 2 x daily - 7 x weekly - 3 reps - 20 seconds hold  - Seated Scapular Retraction  - 2 x daily - 7 x weekly - 20 reps - 5 seconds hold  - Standing Shoulder Horizontal Abduction with Resistance  - 3-4 x weekly - 3 sets - 10 reps  - Standing Shoulder External Rotation with Resistance  - 3-4 x weekly - 3 sets - 10 reps     Charges: Man x 2, DN x 1    Assessment:   Mild exacerbation with change in sleep position last night.  Noted minor improvement in symptoms following treatment today.      Plan:  Continue with manual interventions, dry needling, and exercises for improvements in soft tissue and joint ROM and reduction in neck pain and headaches.  RTC and scapular strengthening.     Azalea Bermeo, PT

## 2025-04-02 ENCOUNTER — TREATMENT (OUTPATIENT)
Dept: PHYSICAL THERAPY | Facility: CLINIC | Age: 65
End: 2025-04-02
Payer: MEDICARE

## 2025-04-02 DIAGNOSIS — M54.12 CERVICAL NEURITIS: ICD-10-CM

## 2025-04-02 DIAGNOSIS — M54.81 BILATERAL OCCIPITAL NEURALGIA: ICD-10-CM

## 2025-04-02 DIAGNOSIS — M54.16 LUMBAR RADICULOPATHY: ICD-10-CM

## 2025-04-02 PROCEDURE — 97110 THERAPEUTIC EXERCISES: CPT | Mod: GP

## 2025-04-02 PROCEDURE — 97140 MANUAL THERAPY 1/> REGIONS: CPT | Mod: GP

## 2025-04-02 NOTE — PROGRESS NOTES
Physical Therapy  Physical Therapy Treatment Note    Patient Name: Tere Moreira  MRN: [id number]  Today's Date: 2025  Time Calculation  Start Time: 918  Stop Time: 1010  Time Calculation (min): 52 min    Insurance:  Visit number: [facility]  Authorization info: No Auth  Insurance Type: A[employer]na Medicare  Medicare Certification Period: Beginnin2025 Endin2025  Onset date: 2024     General:  Reason for visit: Lumbar Radiculopathy, Cervical Neuritis  Referred by: Kim Sargent MD, PhD    Current Problem  1. Bilateral occipital neuralgia  Follow Up In Physical Therapy      2. Cervical neuritis  Follow Up In Physical Therapy      3. Lumbar radiculopathy  Follow Up In Physical Therapy            Precautions: NORBERTADI Fall Risk Score (The score of 4 or more indicates an increased risk of falling): 1       Subjective:     Patient reports that she has been doing ok.  Better at preventing headaches with adjustment of sleep position.  Felt relief for about an hour after last dry needling.      Seeing Dr. Kramer on  for hip pain.     Pain  0/10    Performing HEP?: Yes      Objective:   Palpation: (-) tenderness to the SCM bilaterally    Mild hike of scapula on R with elevation of R arm    Slight lag with R shoulder ER Lag Sign 2    Cervical AROM (Degrees)     Flexion: WNL  Extension: Mild paz  (L) Rotation: Mild paz  (R) Rotation: Mild paz    Treatment Performed:     Therapeutic Exercise:                          28 min  UBE 2.5' F/2.5' R L3  Mid Row 5# 3x10  Low Row 5# 3x10  Standing low trap setting at wall 2x10  Shoulder ER with scapular retraction with back to wall 3x10  Standing shoulder flexion with back to wall R/L 2x10  Standing shoulder scaption with back to wall R/L 2x10     Not performed:  Supine scapular protraction 5# 3x10  Supine cervical retraction 2x10  Supine cervical rotation 2x10 R/L  Supine pec major stretch 1'  Sidelying open book 5x R/L    Manual Therapy:    24 min  STM to  bilateral suboccipitals, cervical multifidi, levator scapulae, upper trap, and proximal SCM  TrP release B Levator scap and B suboccipitals  P-A mobilization to C7-T3 segments   Cervical distraction  PROM cervical spine Rotation and SB  Passive stretching R and L UT, Levator scapulae      Other:        Education re: cervical and scapular muscular anatomy/function and treatment rationale     Personalized Home Exercise Program:  Access Code: [email or web address]  URL: [email or web address]  Date: 02/21/2025  Prepared by: Azalea Bermeo    Exercises  - Supine Cervical Retraction with Towel  - 1-2 x daily - 7 x weekly - 10 reps - 3 seconds hold  - Sidelying Open Book Thoracic Lumbar Rotation and Extension  - 1 x daily - 7 x weekly - 2 sets - 10 reps  - Supine Pectoralis Stretch  - 1 x daily - 7 x weekly - 3 reps - 30 seconds hold  - Seated Cervical Retraction and Rotation  - 2 x daily - 7 x weekly - 10 reps  - Seated Cervical Sidebending AROM  - 2 x daily - 7 x weekly - 10 reps  - Seated Levator Scapulae Stretch  - 2 x daily - 7 x weekly - 3 reps - 20 seconds hold  - Seated Scapular Retraction  - 2 x daily - 7 x weekly - 20 reps - 5 seconds hold  - Standing Shoulder Horizontal Abduction with Resistance  - 3-4 x weekly - 3 sets - 10 reps  - Standing Shoulder External Rotation with Resistance  - 3-4 x weekly - 3 sets - 10 reps     Charges: TE x 2, Man x 1    Assessment:   Did well with exercises today.  Noted increased comfort following manual therapy.     Plan:  Continue with manual interventions, dry needling, and exercises for improvements in soft tissue and joint ROM and reduction in neck pain and headaches.  RTC and scapular strengthening.     Azalea Bermeo, PT

## 2025-04-09 ENCOUNTER — TREATMENT (OUTPATIENT)
Dept: PHYSICAL THERAPY | Facility: CLINIC | Age: 65
End: 2025-04-09
Payer: MEDICARE

## 2025-04-09 DIAGNOSIS — M54.81 BILATERAL OCCIPITAL NEURALGIA: ICD-10-CM

## 2025-04-09 DIAGNOSIS — M54.16 LUMBAR RADICULOPATHY: ICD-10-CM

## 2025-04-09 DIAGNOSIS — M54.12 CERVICAL NEURITIS: ICD-10-CM

## 2025-04-09 PROCEDURE — 97140 MANUAL THERAPY 1/> REGIONS: CPT | Mod: GP

## 2025-04-09 PROCEDURE — 97110 THERAPEUTIC EXERCISES: CPT | Mod: GP

## 2025-04-09 NOTE — PROGRESS NOTES
Physical Therapy  Physical Therapy Treatment Note    Patient Name: Tere Moreira  MRN: [id number]  Today's Date: 2025  Time Calculation  Start Time: 917  Stop Time: 1005  Time Calculation (min): 48 min    Insurance:  Visit number: [place]  Authorization info: No Auth  Insurance Type: A[employer]na Medicare  Medicare Certification Period: Beginnin2025 Endin2025  Onset date: 2024     General:  Reason for visit: Lumbar Radiculopathy, Cervical Neuritis  Referred by: Kim Sargent MD, PhD    Current Problem  1. Bilateral occipital neuralgia  Follow Up In Physical Therapy      2. Cervical neuritis  Follow Up In Physical Therapy      3. Lumbar radiculopathy  Follow Up In Physical Therapy              Precautions: NORBERTADI Fall Risk Score (The score of 4 or more indicates an increased risk of falling): 1       Subjective:     Patient reports that she has been doing ok.  Feels some pull in neck with rotation, but otherwise ok.      Seeing Dr. Kramer on  for hip pain.     Pain  0/10    Performing HEP?: Yes      Objective:   Palpation: (-) tenderness to the SCM bilaterally    Mild hike of scapula on R with elevation of R arm    Slight lag with R shoulder ER Lag Sign 2    Cervical AROM (Degrees)     Flexion: WNL  Extension: Mild paz  (L) Rotation: Mild paz  (R) Rotation: Mild paz    Treatment Performed:     Therapeutic Exercise:                          25 min  UBE 2.5' F/2.5' R L3  Mid Row 7.5# 3x10  Low Row 5# 3x10  Standing low trap setting at wall 2x10  Shoulder ER and scapular retraction green Tband with back to wall 2x10  Shoulder horizontal abduction with green Tband 2x10  Standing shoulder flexion with back to wall R/L 2x10  Standing shoulder scaption with back to wall R/L 2x10  Posterior shoulder rolls x 15     Not performed:  Supine scapular protraction 5# 3x10  Supine cervical retraction 2x10  Supine cervical rotation 2x10 R/L  Supine pec major stretch 1'  Sidelying open book 5x  R/L    Manual Therapy:    23 min  STM to bilateral suboccipitals, cervical multifidi, levator scapulae, upper trap, and proximal SCM  P-A mobilization to C4-T7 segments   Cervical distraction  PROM cervical spine Rotation and SB  Passive stretching R and L UT, Levator scapulae      Other:        Education re: cervical and scapular muscular anatomy/function and treatment rationale     Personalized Home Exercise Program:  Access Code: [email or web address]  URL: [email or web address]  Date: 02/21/2025  Prepared by: Azalea Bermeo    Exercises  - Supine Cervical Retraction with Towel  - 1-2 x daily - 7 x weekly - 10 reps - 3 seconds hold  - Sidelying Open Book Thoracic Lumbar Rotation and Extension  - 1 x daily - 7 x weekly - 2 sets - 10 reps  - Supine Pectoralis Stretch  - 1 x daily - 7 x weekly - 3 reps - 30 seconds hold  - Seated Cervical Retraction and Rotation  - 2 x daily - 7 x weekly - 10 reps  - Seated Cervical Sidebending AROM  - 2 x daily - 7 x weekly - 10 reps  - Seated Levator Scapulae Stretch  - 2 x daily - 7 x weekly - 3 reps - 20 seconds hold  - Seated Scapular Retraction  - 2 x daily - 7 x weekly - 20 reps - 5 seconds hold  - Standing Shoulder Horizontal Abduction with Resistance  - 3-4 x weekly - 3 sets - 10 reps  - Standing Shoulder External Rotation with Resistance  - 3-4 x weekly - 3 sets - 10 reps     Charges: TE x 2, Man x 1    Assessment:   Did well with exercises today.  Noted feeling great post treatment.     Plan:  Continue with manual interventions, dry needling, and exercises for improvements in soft tissue and joint ROM and reduction in neck pain and headaches.  RTC and scapular strengthening.     Azalea Bermeo, PT

## 2025-04-14 ENCOUNTER — OFFICE VISIT (OUTPATIENT)
Dept: ORTHOPEDIC SURGERY | Facility: CLINIC | Age: 65
End: 2025-04-14
Payer: MEDICARE

## 2025-04-14 DIAGNOSIS — M25.552 BILATERAL HIP PAIN: ICD-10-CM

## 2025-04-14 DIAGNOSIS — M17.0 PRIMARY OSTEOARTHRITIS OF BOTH KNEES: ICD-10-CM

## 2025-04-14 DIAGNOSIS — M25.561 ACUTE BILATERAL KNEE PAIN: Primary | ICD-10-CM

## 2025-04-14 DIAGNOSIS — M16.0 BILATERAL PRIMARY OSTEOARTHRITIS OF HIP: ICD-10-CM

## 2025-04-14 DIAGNOSIS — M25.562 ACUTE BILATERAL KNEE PAIN: Primary | ICD-10-CM

## 2025-04-14 DIAGNOSIS — M25.551 BILATERAL HIP PAIN: ICD-10-CM

## 2025-04-14 PROCEDURE — 99214 OFFICE O/P EST MOD 30 MIN: CPT | Performed by: ORTHOPAEDIC SURGERY

## 2025-04-14 PROCEDURE — G2211 COMPLEX E/M VISIT ADD ON: HCPCS | Performed by: ORTHOPAEDIC SURGERY

## 2025-04-14 PROCEDURE — 99204 OFFICE O/P NEW MOD 45 MIN: CPT | Performed by: ORTHOPAEDIC SURGERY

## 2025-04-14 NOTE — PROGRESS NOTES
ORTHOPAEDIC HISTORY AND PHYSICAL    History Of Present Illness  Patient is a 65 y.o. female presenting to the clinic with bilateral  hip and knee pain. This has been ongoing for the past 6 months . She complaints of pain in her  radiating down her leg and to her knee. She has pain along her hamstrings when she bends over. She denies any groin pain.  She is having difficulty with her daily activities, including in and out of the car and going up and down the stairs. She is able to sleep through the night. She has tried physical therapy and cortisone steroid injections. She has Mild L4-5 and L5-S1 facet joint arthropathy and has known Sciatica pain. She walks a lot and participates in boot camp for weight bearing exercises.         Past Medical History  She has a past medical history of Chest pain, unspecified (07/23/2019), Decreased white blood cell count, unspecified (05/01/2019), Hypertension, Pain in left hip (01/16/2017), Personal history of other diseases of the respiratory system (01/08/2020), Personal history of other endocrine, nutritional and metabolic disease, and Strain of unspecified muscle, fascia and tendon at shoulder and upper arm level, left arm, initial encounter (01/16/2017).    Surgical History  She has a past surgical history that includes Other surgical history (01/28/2020) and Adenoidectomy.     Social History  She reports that she has never smoked. She has never used smokeless tobacco. She reports that she does not currently use alcohol. She reports that she does not use drugs.    Family History  Family History   Problem Relation Name Age of Onset    Hypertension Mother      Hypertension Father      Breast cancer Mother's Sister  60 - 69        Allergies  Benzoyl peroxide and Torres       ROS  The patients full medical history, surgical history, medications, allergies, family, medical history, social history, and a complete 14 point review of systems is documented in the medical record on the  signed, scanned medical intake sheet or reviewed in the history of present illness. Review of systems otherwise negative    Physical Exam  Gen: The patient is alert and oriented ×3, is in no acute distress, and appear their stated age and weight.    Psychiatric: Mood and affect are appropriate.    Eyes: Sclera are white, and pupils are round and symmetric.    ENT: Mucous membranes are moist.     Neck: Supple. Thyroid is midline.    Respiratory: Respirations are nonlabored, chest rise is symmetric.    Cardiac: Rate is regular by palpation of distal pulses.     Abdomen: Nondistended.    Integument: No obvious cutaneous lesions are noted. No signs of lymphangitis. No signs of systemic edema.    RIGHT HIP:  Some  pain with rotational range of motion.  External rotation of 50  degrees, Internal rotation of 30 degrees  Flexion 120 degrees.       LEFT HIP:  No pain with rotational range of motion.  External rotation of  60 degrees, Internal rotation of 30 degrees  Flexion 120 degrees.    Exam of the bilateral, left knee revealed no effusion in the knee and skin shows Within normal limits for age and diagnosis or no signs of lymphangitis. The knee demonstrated varus alignment. There was tenderness about the knee, thigh or calf. There was tenderness at the lateral joint space. There was discomfort with patellofemoral compression. The knee came to within 5 degress of full extension. Straight leg raising was without lag, flexion was to 110 degrees and ligamentous stability was full. The neurovascular examination of extremity was intact. The neurological exam including motor and sensory exam was performed. 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  I also reviewed her previous bilateral knee radiographs that revealed degenerative changes of bilateral knee and osteophyte formation and joint space narrowing.    Assessment/Plan   Tere Moreira is a 65 y.o. female presenting with moderate bilateral hip osteoarthritis and knee arthritis symptomatic.  I discussed treatment options with patient.  I discussed with patient that would likely benefit from arthroplasty in the future however I recommend exhausting conservative pain at this time. Patient is in therapy for her shoulders.  I gave her prescription that she can follow-up with her therapist to begin bilateral lower extremity range of motion strengthening exercises.  Patient will continue gentle stretching exercises.   Patient will continue to take over-the-counter anti-inflammatories as needed.    Patient has bilateral  knee osteoarthritis.  We discussed intra-articular corticosteroid injection and patient was very interested however she does not believe that she is psychologically ready to have injections today and would like to schedule and come back for repeat injection in the future.      No diagnosis found.    No follow-ups on file.  Scribe Attestation  By signing my name below, ICristy Scribe attest that this documentation has been prepared under the direction and in the presence of Wilfredo Kramer MD.

## 2025-04-26 NOTE — PROGRESS NOTES
ORTHOPAEDIC HISTORY AND PHYSICAL    History Of Present Illness  Patient is a 65 y.o. female presenting to the clinic with bilateral  hip and knee pain.  I saw her few weeks ago for multiple complaints.  She presents today reporting knee pain is persistent.  This is most of her symptoms.  The knee is painful bilaterally.  No instability.  She is very active.  She is able to walk but this is impeding her ability to do that.      Physical Exam  The patient is well-nourished and well-developed and in no acute distress. The patient displayed normal mood and affect. The patient's pupils were equal, round sclerae are white. Patient's respirations appear to be regular and unlabored.     Exam of the bilateral, left knee revealed no effusion in the knee and skin shows Within normal limits for age and diagnosis or no signs of lymphangitis. The knee demonstrated varus alignment. There was tenderness about the knee, thigh or calf. There was tenderness at the lateral joint space. There was discomfort with patellofemoral compression. The knee came to within 5 degress of full extension. Straight leg raising was without lag, flexion was to 110 degrees and ligamentous stability was full. The neurovascular examination of extremity was intact. The neurological exam including motor and sensory exam was performed. The vascular examination including palpitation of pulses and capillary refill of the foot was performed and determined to be intact       Relevant Results   Imaging: Multiple views of the  patient's bilateral knee and it reveals bilateral knee osteoarthritis and osteophyte formation and joint space narrowing.    Assessment/Plan   Tere Moreira is a 65 y.o. female presenting with severe bilateral knee arthritis symptomatic.  I discussed treatment options with patient.  I discussed with patient that would likely benefit from arthroplasty in the future however I recommend exhausting conservative pain at this time. Patient  will continue to take over-the-counter anti-inflammatories as needed.   We discussed intra-articular corticosteroid injection and patient was very interested and tolerated the procedure well.  We only performed left knee injection today given the patient is prediabetic.  She will come back in the future if she wants injection done on the right side.    Patient is in agreement with this plan.       Patient ID: Tere Moreira is a 65 y.o. female.    L Inj/Asp: L knee on 5/5/2025 1:08 PM  Indications: pain  Details: 20 G needle, anterolateral approach  Medications: 40 mg triamcinolone acetonide 40 mg/mL; 5 mL lidocaine 10 mg/mL (1 %)  Procedure, treatment alternatives, risks and benefits explained, specific risks discussed. Consent was given by the patient. Immediately prior to procedure a time out was called to verify the correct patient, procedure, equipment, support staff and site/side marked as required. Patient was prepped and draped in the usual sterile fashion.         1. Primary osteoarthritis of both knees  XR knee 3 views bilateral          No follow-ups on file.  Scribe Attestation  By signing my name below, I, Epifanio Sumner attest that this documentation has been prepared under the direction and in the presence of Wilfredo Kramer MD.

## 2025-04-27 DIAGNOSIS — R73.03 PREDIABETES: ICD-10-CM

## 2025-04-27 DIAGNOSIS — E78.5 HYPERLIPIDEMIA, UNSPECIFIED: ICD-10-CM

## 2025-04-28 ENCOUNTER — APPOINTMENT (OUTPATIENT)
Dept: ORTHOPEDIC SURGERY | Facility: HOSPITAL | Age: 65
End: 2025-04-28
Payer: MEDICARE

## 2025-04-30 ENCOUNTER — TREATMENT (OUTPATIENT)
Dept: PHYSICAL THERAPY | Facility: CLINIC | Age: 65
End: 2025-04-30
Payer: MEDICARE

## 2025-04-30 DIAGNOSIS — G89.29 CHRONIC PAIN OF BOTH KNEES: ICD-10-CM

## 2025-04-30 DIAGNOSIS — M25.561 CHRONIC PAIN OF BOTH KNEES: ICD-10-CM

## 2025-04-30 DIAGNOSIS — M25.552 BILATERAL HIP PAIN: Primary | ICD-10-CM

## 2025-04-30 DIAGNOSIS — M54.16 LUMBAR RADICULOPATHY: ICD-10-CM

## 2025-04-30 DIAGNOSIS — M54.81 BILATERAL OCCIPITAL NEURALGIA: ICD-10-CM

## 2025-04-30 DIAGNOSIS — M54.12 CERVICAL NEURITIS: ICD-10-CM

## 2025-04-30 DIAGNOSIS — M25.562 CHRONIC PAIN OF BOTH KNEES: ICD-10-CM

## 2025-04-30 DIAGNOSIS — M25.551 BILATERAL HIP PAIN: Primary | ICD-10-CM

## 2025-04-30 PROCEDURE — 97110 THERAPEUTIC EXERCISES: CPT | Mod: GP

## 2025-04-30 PROCEDURE — 97530 THERAPEUTIC ACTIVITIES: CPT | Mod: GP

## 2025-04-30 NOTE — PROGRESS NOTES
Physical Therapy  Physical Therapy Orthopedic Progress Note    Patient Name: Tere Moreira  MRN: [id number]  Today's Date: 2025  Time Calculation  Start Time: 1540  Stop Time: 1635  Time Calculation (min): 55 min    Insurance:  Visit number: 9 [facility]  Authorization info: No Auth  Insurance Type: Aetna Medicare  Medicare Certification Period: Beginnin2025 Endin2025  Medicare Re-Certification Period: Beginnin2025 Endin2025  Onset date: 2024     General:  Reason for visit: Lumbar Radiculopathy, Cervical Neuritis  Referred by: Kim Sargent MD, PhD    New:  Reason for visit: Hip and Knee OA  Referred by: Wilfredo Kramer MD    Current Problem  1. Bilateral hip pain  Follow Up In Physical Therapy      2. Chronic pain of both knees  Follow Up In Physical Therapy      3. Lumbar radiculopathy  Follow Up In Physical Therapy    Follow Up In Physical Therapy      4. Cervical neuritis  Follow Up In Physical Therapy    Follow Up In Physical Therapy      5. Bilateral occipital neuralgia  Follow Up In Physical Therapy    Follow Up In Physical Therapy          Precautions: STEADI Fall Risk Score (The score of 4 or more indicates an increased risk of falling): 1       Subjective:     Patient reports that she saw Dr. Kramer.  Has a new referral for bilateral hip and bilateral knee OA. Pain and stiffness fluctuates.  This has been a chronic issue. She is still able to stay active and do regular walking.  Receiving cortisone injections in bilateral knees on Monday.     Notes that she has had a flair of left sciatic pain following a flight on Monday.    Neck symptoms have improved at least 90% since starting therapy.  No more headaches.  Improved mobility of the neck.       Pain  Current: neck: 0/10, back 0/10, left hip 5/10, left knee 0/10  At worst: neck: 0/10, back 8/10, left hip 5/10, left knee 5/10    Description:   back/sciatica--sharp, radiating   hips and knees--ache,  stiffness    Exacerbating Factors: getting out of the car, prolonged sitting (>1 hour 15 minutes), stationary positions, ascending/descending stairs    Alleviating Factors: walking, movement    Functional Limitations: Squats, impact activities, running    Self Reported Function (0-100%) = 80-90%  - 100% being back to PLOF    Performing HEP?: Yes      Objective:     Posture: Mild kyphosis and FH; level PSIS bilaterally    Gait: Stable without assistive device.  Antalgic first few steps following sit-stand, then with good stride length      ROM    Lumbar AROM (%)    Flexion: 80%, hamstring pull  Extension: 100%  (R) Side Bend: 85%  (L) Side Bend: 100%  (R) Rotation: 100%  (L) Rotation: 100%      Hip AROM (Degrees)       (R)  (L)   Extension:  10  10    Abduction:  29  32      ER:   40  45     IR:   43  30        Hip PROM (Degrees)       (R)  (L)  Flexion:  140  140         Knee AROM (Degrees)       (R)  (L)  Flexion:  138  138   Extension:  0  0           Strength Testing    Hip     (R)  (L)  Flexion:  4+  4+     Extension:  4  4    Abduction:  4-  4+      Knee     (R)  (L)  Flexion:  5  5     Extension:  5  5    Ankle     (R)  (L)  Dorsiflexion:  5  5  Plantarflexion:   NT  NT     Core Control:  Pelvic Bridge: Reduced bridge height         Flexibility       (R)  (L)  Hamstrings:  25  20  Hip Flexors:  Min paz Min paz  Quadriceps:  116  113       Functional Movement  Sit to stand: without UE assist  Squat: Reduced depth  Bed Mobility: Independent         Neuro:    Dermatomes: Intact to light touch      Special Tests    Radicular Symptoms: (+) LLE  Leg Length Discrepancy: (-)  SABINA Test: Normal range, without pain bilaterally    Outcome Measures: Updated 4/30/2025  Other Measures  Lower Extremity Funtional Score (LEFS): 51/80  Neck Disability Index: 12%      Goals: Updated 4/30/2025  Active       PT Problem       PT Goal 1 (Met)       Start:  01/27/25    Expected End:  02/17/25    Resolved:  04/30/25    Demonstrate  home exercise program adherence for management of symptoms.          PT Goal 2 (Progressing)       Start:  01/27/25    Expected End:  03/24/25       Cervical AROM WNL and without pain >1-2/10. Progressing  Bilateral shoulder strength measures of at least 4+/5. Progressing  Demonstrate good maintenance of neutral postural alignment throughout treatment session. Met  Sleep without interruption from neck pain. Met  Decrease headache frequency from daily to 2x/week or less. Met  Patient will rate neck pain < 4/10 at worst to improve ADL tolerance. Met  Independent with home exercise program for symptom reduction and functional gains.          PT Goal 3       Start:  04/30/25    Expected End:  07/29/25       Lumbar ROM increased to WFL to allow for improved functional mobility.  Hip strength measures improved to 5/5 for improved functional mobility  Ascend/descend stairs without pain >4/10 back, hips, and knees.  Transfer in/out of car without pain > 2/10.   Independent with HEP for functional progression and symptom management.             Treatment Performed:    Therapeutic Activity:    35 min  Performed evaluation of back, hips, knees  Discussed management of OA and POC    Therapeutic Exercise:    20 min  Reviewed HEP.  Advanced to include exercises to address back, hips and knees.      Personalized Home Exercise Program:  Access Code: [email or web address]  URL: [email or web address]  Date: 04/30/2025  Prepared by: Azalea Bermeo    Exercises  - Seated Cervical Retraction and Rotation  - 1 x daily - 7 x weekly - 10 reps  - Seated Cervical Sidebending AROM  - 1 x daily - 7 x weekly - 10 reps  - Seated Levator Scapulae Stretch  - 1 x daily - 7 x weekly - 3 reps - 20 seconds hold  - Seated Scapular Retraction  - 1 x daily - 7 x weekly - 20 reps - 5 seconds hold  - Standing Shoulder Horizontal Abduction with Resistance  - 3 x weekly - 3 sets - 10 reps  - Standing Shoulder External Rotation with Resistance   - 3 x weekly - 3 sets - 10 reps  - Supine Cervical Retraction with Towel  - 3 x weekly - 10 reps - 3 seconds hold  - Supine Pectoralis Stretch  - 3 x weekly - 3 reps - 30 seconds hold  - Sidelying Open Book Thoracic Lumbar Rotation and Extension  - 3 x weekly - 2 sets - 10 reps  - Supine Single Knee to Chest Stretch  - 3 x weekly - 3 reps - 30 second hold  - Supine Posterior Pelvic Tilt  - 3 x weekly - 20 reps - 5 seconds hold  - Supine Bridge  - 3 x weekly - 3 sets - 10 reps - 5 seconds hold  - Clamshell  - 3 x weekly - 3 sets - 10 reps  - Prone Quad Stretch with Towel Roll and Strap  - 1 x daily - 7 x weekly - 3 reps - 30 seconds hold    EDUCATION: home exercise program, plan of care, activity modifications, pain management, and injury pathology       Charges: TE x 2, TA x 2    Assessment:  Patient returns to PT with new referral for bilateral hip and knee OA.  She demonstrates core and hip mm. weakness, deficits in quad flexibility, and minor ROM deficits.  Instructed in initial home exercises. Will benefit from course of PT to further address further limitations, pain levels, and function.      Cervical symptoms have improved considerably since the initiation of PT.  She has made very good progress towards all goals.     Rehab potential: Good    Plan of Care: Updated 4/30/2025     Planned Interventions include: therapeutic exercise, self-care home management, manual therapy, therapeutic activities, gait training, neuromuscular coordination, aquatic therapy  Frequency: 1x every 1-2 weeks.   Duration: 4-6 sessions.    Azalea Bermeo, PT

## 2025-05-01 RX ORDER — ATORVASTATIN CALCIUM 40 MG/1
40 TABLET, FILM COATED ORAL NIGHTLY
Qty: 90 TABLET | Refills: 1 | Status: SHIPPED | OUTPATIENT
Start: 2025-05-01 | End: 2025-05-02 | Stop reason: SDUPTHER

## 2025-05-01 RX ORDER — EZETIMIBE 10 MG/1
10 TABLET ORAL DAILY
Qty: 90 TABLET | Refills: 1 | Status: SHIPPED | OUTPATIENT
Start: 2025-05-01 | End: 2025-05-02 | Stop reason: SDUPTHER

## 2025-05-02 ENCOUNTER — APPOINTMENT (OUTPATIENT)
Dept: PRIMARY CARE | Facility: CLINIC | Age: 65
End: 2025-05-02
Payer: MEDICARE

## 2025-05-02 VITALS
SYSTOLIC BLOOD PRESSURE: 120 MMHG | WEIGHT: 146 LBS | TEMPERATURE: 98 F | DIASTOLIC BLOOD PRESSURE: 77 MMHG | HEART RATE: 87 BPM | BODY MASS INDEX: 22.87 KG/M2

## 2025-05-02 DIAGNOSIS — Z23 IMMUNIZATION DUE: ICD-10-CM

## 2025-05-02 DIAGNOSIS — Z12.11 COLON CANCER SCREENING: ICD-10-CM

## 2025-05-02 DIAGNOSIS — I10 ESSENTIAL (PRIMARY) HYPERTENSION: ICD-10-CM

## 2025-05-02 DIAGNOSIS — R35.0 INCREASED URINARY FREQUENCY: ICD-10-CM

## 2025-05-02 DIAGNOSIS — M54.81 BILATERAL OCCIPITAL NEURALGIA: ICD-10-CM

## 2025-05-02 DIAGNOSIS — M54.11 RADICULOPATHY OF OCCIPITO-ATLANTO-AXIAL REGION: ICD-10-CM

## 2025-05-02 DIAGNOSIS — Z12.31 VISIT FOR SCREENING MAMMOGRAM: ICD-10-CM

## 2025-05-02 DIAGNOSIS — Z13.220 SCREENING CHOLESTEROL LEVEL: ICD-10-CM

## 2025-05-02 DIAGNOSIS — Z13.1 DIABETES MELLITUS SCREENING: ICD-10-CM

## 2025-05-02 DIAGNOSIS — R73.03 PREDIABETES: ICD-10-CM

## 2025-05-02 DIAGNOSIS — Z78.0 ASYMPTOMATIC MENOPAUSE: ICD-10-CM

## 2025-05-02 DIAGNOSIS — Z00.00 MEDICARE ANNUAL WELLNESS VISIT, SUBSEQUENT: Primary | ICD-10-CM

## 2025-05-02 DIAGNOSIS — E78.5 HYPERLIPIDEMIA, UNSPECIFIED: ICD-10-CM

## 2025-05-02 DIAGNOSIS — M54.16 LUMBAR RADICULOPATHY: ICD-10-CM
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
## 2025-05-02 DIAGNOSIS — M54.32 LEFT SCIATIC NERVE PAIN: ICD-10-CM

## 2025-05-02 DIAGNOSIS — M54.12 CERVICAL NEURITIS: ICD-10-CM

## 2025-05-02 RX ORDER — TOLTERODINE 4 MG/1
4 CAPSULE, EXTENDED RELEASE ORAL DAILY
Qty: 90 CAPSULE | Refills: 1 | Status: SHIPPED | OUTPATIENT
Start: 2025-05-02 | End: 2025-10-29

## 2025-05-02 RX ORDER — LOSARTAN POTASSIUM AND HYDROCHLOROTHIAZIDE 12.5; 5 MG/1; MG/1
1 TABLET ORAL DAILY
Qty: 90 TABLET | Refills: 1 | Status: SHIPPED | OUTPATIENT
Start: 2025-05-02

## 2025-05-02 RX ORDER — GABAPENTIN 100 MG/1
CAPSULE ORAL
Qty: 63 CAPSULE | Refills: 0 | Status: SHIPPED | OUTPATIENT
Start: 2025-05-02 | End: 2025-05-02

## 2025-05-02 RX ORDER — EZETIMIBE 10 MG/1
10 TABLET ORAL DAILY
Qty: 90 TABLET | Refills: 1 | Status: SHIPPED | OUTPATIENT
Start: 2025-05-02

## 2025-05-02 RX ORDER — ATORVASTATIN CALCIUM 40 MG/1
40 TABLET, FILM COATED ORAL NIGHTLY
Qty: 90 TABLET | Refills: 1 | Status: SHIPPED | OUTPATIENT
Start: 2025-05-02

## 2025-05-02 RX ORDER — BETAMETHASONE VALERATE 1 MG/G
CREAM TOPICAL 2 TIMES DAILY
Qty: 45 G | Refills: 0 | Status: SHIPPED | OUTPATIENT
Start: 2025-05-02

## 2025-05-02 RX ORDER — GABAPENTIN 100 MG/1
CAPSULE ORAL
Qty: 90 CAPSULE | Refills: 5 | Status: SHIPPED | OUTPATIENT
Start: 2025-05-02 | End: 2025-05-30

## 2025-05-02 ASSESSMENT — ENCOUNTER SYMPTOMS
DEPRESSION: 0
OCCASIONAL FEELINGS OF UNSTEADINESS: 0
LOSS OF SENSATION IN FEET: 0

## 2025-05-02 ASSESSMENT — PATIENT HEALTH QUESTIONNAIRE - PHQ9
1. LITTLE INTEREST OR PLEASURE IN DOING THINGS: NOT AT ALL
SUM OF ALL RESPONSES TO PHQ9 QUESTIONS 1 AND 2: 0
2. FEELING DOWN, DEPRESSED OR HOPELESS: NOT AT ALL

## 2025-05-02 ASSESSMENT — ACTIVITIES OF DAILY LIVING (ADL)
TAKING_MEDICATION: INDEPENDENT
GROCERY_SHOPPING: INDEPENDENT
DRESSING: INDEPENDENT
DOING_HOUSEWORK: INDEPENDENT
MANAGING_FINANCES: INDEPENDENT
BATHING: INDEPENDENT

## 2025-05-02 NOTE — ASSESSMENT & PLAN NOTE
-Gabapentin 100 mg TID, titrate up to 300 mg TID.  Resumed.  Side effects discussed.   -PT continued for now.

## 2025-05-02 NOTE — PROGRESS NOTES
Subjective   Patient ID: Tere Moreira is a 65 y.o. female who presents for Medicare Annual Wellness Visit Initial.  HPI  64 yo AA female with a history of HLP,HTN,  here for:    1- MWV:    -mammogram 8/2023-8/30/2024: wnl.  -pap smear 2019- 08/02/2024: wnl- no HPV.  No previous abnormal pap smear.  -colonoscopy 2020. Repeat in 10y.  -blood test ordered.  -dexa scan 3/19/2024: wnl.  Repeat in 2y.   -Prevnar 20 vaccine given.  -Shingrix to get at the pharmacy.  -Eye exam 8/2023-7/2024: cataracts; recommended.  -Hearing is intact.   2- left sciatica [pain, not controlled.     A review of system was completed.  All systems were reviewed and were normal, except for the ones that are listed in the HPI.    Objective   Physical Exam  Constitutional:       Appearance: Normal appearance.   HENT:      Head: Normocephalic and atraumatic.      Right Ear: Tympanic membrane, ear canal and external ear normal.      Left Ear: Tympanic membrane, ear canal and external ear normal.      Nose: Nose normal.      Mouth/Throat:      Mouth: Mucous membranes are moist.      Pharynx: Oropharynx is clear.   Eyes:      Extraocular Movements: Extraocular movements intact.      Conjunctiva/sclera: Conjunctivae normal.      Pupils: Pupils are equal, round, and reactive to light.   Cardiovascular:      Rate and Rhythm: Normal rate and regular rhythm.      Pulses: Normal pulses.   Pulmonary:      Effort: Pulmonary effort is normal.      Breath sounds: Normal breath sounds.   Abdominal:      General: Abdomen is flat. Bowel sounds are normal.      Palpations: Abdomen is soft.   Musculoskeletal:         General: Normal range of motion.      Cervical back: Normal range of motion and neck supple.   Skin:     General: Skin is warm.   Neurological:      General: No focal deficit present.      Mental Status: She is alert and oriented to person, place, and time. Mental status is at baseline.   Psychiatric:         Mood and Affect: Mood normal.          Behavior: Behavior normal.         Thought Content: Thought content normal.         Judgment: Judgment normal.     Assessment/Plan   Problem List Items Addressed This Visit       Prediabetes    Relevant Medications    atorvastatin (Lipitor) 40 mg tablet    Other Relevant Orders    CBC    Comprehensive Metabolic Panel    Hemoglobin A1C    Lipid Panel    TSH with reflex to Free T4 if abnormal    Visit for screening mammogram    Relevant Orders    BI mammo bilateral screening tomosynthesis    Asymptomatic menopause    Colon cancer screening    Screening cholesterol level    Relevant Orders    Lipid Panel    Diabetes mellitus screening    Relevant Orders    Hemoglobin A1C    Immunization due    Essential (primary) hypertension    Relevant Medications    losartan-hydrochlorothiazide (Hyzaar) 50-12.5 mg tablet    Other Relevant Orders    CBC    Comprehensive Metabolic Panel    Lipid Panel    Hyperlipidemia, unspecified    Relevant Medications    ezetimibe (Zetia) 10 mg tablet    Other Relevant Orders    Lipid Panel    TSH with reflex to Free T4 if abnormal    Increased urinary frequency    Relevant Medications    tolterodine LA (Detrol LA) 4 mg 24 hr capsule    Bilateral occipital neuralgia    Relevant Medications    gabapentin (Neurontin) 100 mg capsule    Medicare annual wellness visit, subsequent - Primary    -mammogram 8/2023-8/30/2024: wnl.  -pap smear 2019- 08/02/2024: wnl- no HPV.  No previous abnormal pap smear.  -colonoscopy 2020. Repeat in 10y.  -blood test ordered.  -dexa scan 3/19/2024: wnl.  Repeat in 2y.   -Prevnar 20 vaccine given.  -Shingrix to get at the pharmacy.  -Eye exam 8/2023-7/2024: cataracts; recommended.  -Hearing is intact.         Relevant Orders    CBC    Comprehensive Metabolic Panel    Hemoglobin A1C    Lipid Panel    TSH with reflex to Free T4 if abnormal    BI mammo bilateral screening tomosynthesis    Bug bite without infection    Relevant Medications    betamethasone valerate (Valisone)  0.1 % cream    Radiculopathy of hrflpvas-[id number]-qjcvm region    Relevant Medications    gabapentin (Neurontin) 100 mg capsule    Cervical neuritis    Relevant Medications    gabapentin (Neurontin) 100 mg capsule    Lumbar radiculopathy    -Gabapentin 100 mg TID, titrate up to 300 mg TID.  Resumed.  Side effects discussed.   -PT continued for now.            Relevant Medications    gabapentin (Neurontin) 100 mg capsule    Left sciatic nerve pain    Patient to return to office in 6 months.

## 2025-05-02 NOTE — ASSESSMENT & PLAN NOTE
-mammogram 8/2023-8/30/2024: wnl.  -pap smear 2019- 08/02/2024: wnl- no HPV.  No previous abnormal pap smear.  -colonoscopy 2020. Repeat in 10y.  -blood test ordered.  -dexa scan 3/19/2024: wnl.  Repeat in 2y.   -Prevnar 20 vaccine given.  -Shingrix to get at the pharmacy.  -Eye exam 8/2023-7/2024: cataracts; recommended.  -Hearing is intact.

## 2025-05-05 ENCOUNTER — HOSPITAL ENCOUNTER (OUTPATIENT)
Dept: RADIOLOGY | Facility: CLINIC | Age: 65
Discharge: HOME | End: 2025-05-05
Payer: MEDICARE

## 2025-05-05 ENCOUNTER — OFFICE VISIT (OUTPATIENT)
Dept: ORTHOPEDIC SURGERY | Facility: CLINIC | Age: 65
End: 2025-05-05
Payer: MEDICARE

## 2025-05-05 DIAGNOSIS — M17.0 PRIMARY OSTEOARTHRITIS OF BOTH KNEES: ICD-10-CM

## 2025-05-05 DIAGNOSIS — M25.562 ACUTE PAIN OF LEFT KNEE: Primary | ICD-10-CM

## 2025-05-05 PROCEDURE — 20610 DRAIN/INJ JOINT/BURSA W/O US: CPT | Mod: LT | Performed by: ORTHOPAEDIC SURGERY

## 2025-05-05 PROCEDURE — 73562 X-RAY EXAM OF KNEE 3: CPT | Mod: 50

## 2025-05-05 PROCEDURE — 73562 X-RAY EXAM OF KNEE 3: CPT | Mod: BILATERAL PROCEDURE | Performed by: RADIOLOGY

## 2025-05-05 PROCEDURE — 2500000004 HC RX 250 GENERAL PHARMACY W/ HCPCS (ALT 636 FOR OP/ED): Performed by: ORTHOPAEDIC SURGERY

## 2025-05-05 PROCEDURE — 99214 OFFICE O/P EST MOD 30 MIN: CPT | Mod: 25 | Performed by: ORTHOPAEDIC SURGERY

## 2025-05-05 PROCEDURE — 99214 OFFICE O/P EST MOD 30 MIN: CPT | Performed by: ORTHOPAEDIC SURGERY

## 2025-05-05 RX ORDER — TRIAMCINOLONE ACETONIDE 40 MG/ML
40 INJECTION, SUSPENSION INTRA-ARTICULAR; INTRAMUSCULAR
Status: COMPLETED | OUTPATIENT
Start: 2025-05-05 | End: 2025-05-05

## 2025-05-05 RX ORDER — LIDOCAINE HYDROCHLORIDE 10 MG/ML
5 INJECTION, SOLUTION INFILTRATION; PERINEURAL
Status: COMPLETED | OUTPATIENT
Start: 2025-05-05 | End: 2025-05-05

## 2025-05-05 RX ADMIN — TRIAMCINOLONE ACETONIDE 40 MG: 40 INJECTION, SUSPENSION INTRA-ARTICULAR; INTRAMUSCULAR at 13:08

## 2025-05-05 RX ADMIN — LIDOCAINE HYDROCHLORIDE 5 ML: 10 INJECTION, SOLUTION INFILTRATION; PERINEURAL at 13:08

## 2025-05-07 ENCOUNTER — TREATMENT (OUTPATIENT)
Dept: PHYSICAL THERAPY | Facility: CLINIC | Age: 65
End: 2025-05-07
Payer: MEDICARE

## 2025-05-07 DIAGNOSIS — M25.551 BILATERAL HIP PAIN: Primary | ICD-10-CM

## 2025-05-07 DIAGNOSIS — M25.561 CHRONIC PAIN OF BOTH KNEES: ICD-10-CM

## 2025-05-07 DIAGNOSIS — M54.16 LUMBAR RADICULOPATHY: ICD-10-CM

## 2025-05-07 DIAGNOSIS — G89.29 CHRONIC PAIN OF BOTH KNEES: ICD-10-CM

## 2025-05-07 DIAGNOSIS — M25.562 CHRONIC PAIN OF BOTH KNEES: ICD-10-CM

## 2025-05-07 DIAGNOSIS — M25.552 BILATERAL HIP PAIN: Primary | ICD-10-CM

## 2025-05-07 DIAGNOSIS — M54.12 CERVICAL NEURITIS: ICD-10-CM

## 2025-05-07 DIAGNOSIS — M54.81 BILATERAL OCCIPITAL NEURALGIA: ICD-10-CM

## 2025-05-07 PROCEDURE — 97110 THERAPEUTIC EXERCISES: CPT | Mod: GP

## 2025-05-07 NOTE — PROGRESS NOTES
"  Physical Therapy  Physical Therapy Treatment Note    Patient Name: Tere Moreira  MRN: [id number]  Today's Date: 2025  Time Calculation  Start Time: 1532  Stop Time: 1617  Time Calculation (min): 45 min    Insurance:  Visit number: 10 [facility]  Authorization info: No Auth  Insurance Type: Aetna Medicare  Medicare Certification Period: Beginnin2025 Endin2025  Medicare Re-Certification Period: Beginnin2025 Endin2025  Onset date: 2024     General:  Reason for visit: Lumbar Radiculopathy, Cervical Neuritis  Referred by: Kim Sargent MD, PhD     New:  Reason for visit: Hip and Knee OA  Referred by: Wilfredo Kramer MD    Current Problem  1. Bilateral hip pain        2. Chronic pain of both knees        3. Lumbar radiculopathy  Follow Up In Physical Therapy      4. Cervical neuritis  Follow Up In Physical Therapy      5. Bilateral occipital neuralgia  Follow Up In Physical Therapy          Precautions: STEADI Fall Risk Score (The score of 4 or more indicates an increased risk of falling): 1       Subjective:     Patient reports that she had an injection to the left knee and it has \"made a world of difference.\"  The right knee will be injected on 2025. The hips are feeling pretty good today.  Started Gabapentin on 25, and it has helped.     Pain   0/10    Performing HEP?: Yes      Objective:   Reduced L hip IR ROM vs R      Treatment Performed:    Therapeutic Exercise:    40 min  R. Bike L3 5'  Bridges 2x10  Tball bridges 5\" hold 1'x 2  Clamshells 3x10 R/L  Reverse clamshells 3x10 R/L  Marching bridge 2x10  DLS unsupported march 60\"x1, 30\" x 2  Resisted hip abduction 22# 3x10 R/L  Resisted hip flexion 22# 3x10  Standing quad stretch 30\" R/L    Neuromuscular Re-education:  5 min  Bottoms up with 2kg KB with march 2x30\"   SLS with \"kickstand\" and 4kg KB swing 10x cw/ccw R/L        Personalized Home Exercise Program:  Access Code: [email or web address]  URL: " [email or web address]  Date: 04/30/2025  Prepared by: Azalea Bermeo     Exercises  - Seated Cervical Retraction and Rotation  - 1 x daily - 7 x weekly - 10 reps  - Seated Cervical Sidebending AROM  - 1 x daily - 7 x weekly - 10 reps  - Seated Levator Scapulae Stretch  - 1 x daily - 7 x weekly - 3 reps - 20 seconds hold  - Seated Scapular Retraction  - 1 x daily - 7 x weekly - 20 reps - 5 seconds hold  - Standing Shoulder Horizontal Abduction with Resistance  - 3 x weekly - 3 sets - 10 reps  - Standing Shoulder External Rotation with Resistance  - 3 x weekly - 3 sets - 10 reps  - Supine Cervical Retraction with Towel  - 3 x weekly - 10 reps - 3 seconds hold  - Supine Pectoralis Stretch  - 3 x weekly - 3 reps - 30 seconds hold  - Sidelying Open Book Thoracic Lumbar Rotation and Extension  - 3 x weekly - 2 sets - 10 reps  - Supine Single Knee to Chest Stretch  - 3 x weekly - 3 reps - 30 second hold  - Supine Posterior Pelvic Tilt  - 3 x weekly - 20 reps - 5 seconds hold  - Supine Bridge  - 3 x weekly - 3 sets - 10 reps - 5 seconds hold  - Clamshell  - 3 x weekly - 3 sets - 10 reps  - Prone Quad Stretch with Towel Roll and Strap  - 1 x daily - 7 x weekly - 3 reps - 30 seconds hold     Charges: TE x 3    Assessment:   Did well with the exercises performed today.  Challenged with SLS with KB swing. Demonstrates stiffness with hip internal rotation L hip vs R.       Plan:  Continue to address hip/core strength and stability, LE strength, and flexibility to address hip pain, knee pain, and sciatic pain.  Revisit neck exercises as needed.       Azalea Bermeo, PT

## 2025-05-08 LAB
ALBUMIN SERPL-MCNC: 4.5 G/DL (ref 3.6–5.1)
ALP SERPL-CCNC: 42 U/L (ref 37–153)
ALT SERPL-CCNC: 18 U/L (ref 6–29)
ANION GAP SERPL CALCULATED.4IONS-SCNC: 10 MMOL/L (CALC) (ref 7–17)
AST SERPL-CCNC: 22 U/L (ref 10–35)
BILIRUB SERPL-MCNC: 0.6 MG/DL (ref 0.2–1.2)
BUN SERPL-MCNC: 28 MG/DL (ref 7–25)
CALCIUM SERPL-MCNC: 9.3 MG/DL (ref 8.6–10.4)
CHLORIDE SERPL-SCNC: 106 MMOL/L (ref 98–110)
CHOLEST SERPL-MCNC: 149 MG/DL
CHOLEST/HDLC SERPL: 1.9 (CALC)
CO2 SERPL-SCNC: 26 MMOL/L (ref 20–32)
CREAT SERPL-MCNC: 0.66 MG/DL (ref 0.5–1.05)
EGFRCR SERPLBLD CKD-EPI 2021: 97 ML/MIN/1.73M2
ERYTHROCYTE [DISTWIDTH] IN BLOOD BY AUTOMATED COUNT: 13.2 % (ref 11–15)
EST. AVERAGE GLUCOSE BLD GHB EST-MCNC: 126 MG/DL
EST. AVERAGE GLUCOSE BLD GHB EST-SCNC: 7 MMOL/L
GLUCOSE SERPL-MCNC: 85 MG/DL (ref 65–99)
HBA1C MFR BLD: 6 %
HCT VFR BLD AUTO: 37.3 % (ref 35–45)
HDLC SERPL-MCNC: 79 MG/DL
HGB BLD-MCNC: 11.9 G/DL (ref 11.7–15.5)
LDLC SERPL CALC-MCNC: 56 MG/DL (CALC)
MCH RBC QN AUTO: 27 PG (ref 27–33)
MCHC RBC AUTO-ENTMCNC: 31.9 G/DL (ref 32–36)
MCV RBC AUTO: 84.6 FL (ref 80–100)
NONHDLC SERPL-MCNC: 70 MG/DL (CALC)
PLATELET # BLD AUTO: 178 THOUSAND/UL (ref 140–400)
PMV BLD REES-ECKER: 11.4 FL (ref 7.5–12.5)
POTASSIUM SERPL-SCNC: 4.2 MMOL/L (ref 3.5–5.3)
PROT SERPL-MCNC: 7.2 G/DL (ref 6.1–8.1)
RBC # BLD AUTO: 4.41 MILLION/UL (ref 3.8–5.1)
SODIUM SERPL-SCNC: 142 MMOL/L (ref 135–146)
TRIGL SERPL-MCNC: 48 MG/DL
TSH SERPL-ACNC: 1.06 MIU/L (ref 0.4–4.5)
WBC # BLD AUTO: 8.4 THOUSAND/UL (ref 3.8–10.8)

## 2025-05-12 ENCOUNTER — TELEPHONE (OUTPATIENT)
Dept: PRIMARY CARE | Facility: CLINIC | Age: 65
End: 2025-05-12
Payer: MEDICARE

## 2025-05-15 ENCOUNTER — TELEMEDICINE (OUTPATIENT)
Dept: PRIMARY CARE | Facility: CLINIC | Age: 65
End: 2025-05-15
Payer: MEDICARE

## 2025-05-15 DIAGNOSIS — E78.00 PURE HYPERCHOLESTEROLEMIA: ICD-10-CM

## 2025-05-15 DIAGNOSIS — R73.03 PREDIABETES: Primary | ICD-10-CM

## 2025-05-15 PROCEDURE — 99213 OFFICE O/P EST LOW 20 MIN: CPT | Performed by: FAMILY MEDICINE

## 2025-05-15 PROCEDURE — G2211 COMPLEX E/M VISIT ADD ON: HCPCS | Performed by: FAMILY MEDICINE

## 2025-05-15 NOTE — ASSESSMENT & PLAN NOTE
-  low sugar/low carbohydrate diet, increased exercise recommended.  - Referral to a nutritionist done today.  - Repeat blood test in 3 months recommended.  Ordered today.

## 2025-05-15 NOTE — PROGRESS NOTES
Subjective   Patient ID: Tere Moreira is a 65 y.o. female who presents for No chief complaint on file..  HPI  66 yo AA female with a history of HLP,HTN,  here for a discussion about her blood test result.  1.  Her A1c is at 6.0 (5/2/2025).  2.  LDL cholesterol is well-controlled at 56 with atorvastatin 40 mg  And ezetimibe.    A review of system was completed.  All systems were reviewed and were normal, except for the ones that are listed in the HPI.    Objective   Physical Exam    Assessment/Plan   Problem List Items Addressed This Visit       Hyperlipemia    - Very well-controlled-Well controlled.  -Atorvastatin 40 mg at bedtime and Zetia 10 mg every day continued.           Prediabetes - Primary    -  low sugar/low carbohydrate diet, increased exercise recommended.  - Referral to a nutritionist done today.  - Repeat blood test in 3 months recommended.  Ordered today.         Relevant Orders    Referral to Nutrition Services    Hemoglobin A1C    Comprehensive Metabolic Panel    Patient to return to office in 3 to 6 months for reassessment.

## 2025-05-15 NOTE — ASSESSMENT & PLAN NOTE
- Very well-controlled-Well controlled.  -Atorvastatin 40 mg at bedtime and Zetia 10 mg every day continued.

## 2025-05-21 ENCOUNTER — TREATMENT (OUTPATIENT)
Dept: PHYSICAL THERAPY | Facility: CLINIC | Age: 65
End: 2025-05-21
Payer: MEDICARE

## 2025-05-21 DIAGNOSIS — M25.552 BILATERAL HIP PAIN: Primary | ICD-10-CM

## 2025-05-21 DIAGNOSIS — M25.551 BILATERAL HIP PAIN: Primary | ICD-10-CM

## 2025-05-21 DIAGNOSIS — M25.562 CHRONIC PAIN OF BOTH KNEES: ICD-10-CM

## 2025-05-21 DIAGNOSIS — M54.12 CERVICAL NEURITIS: ICD-10-CM

## 2025-05-21 DIAGNOSIS — G89.29 CHRONIC PAIN OF BOTH KNEES: ICD-10-CM

## 2025-05-21 DIAGNOSIS — M25.561 CHRONIC PAIN OF BOTH KNEES: ICD-10-CM

## 2025-05-21 DIAGNOSIS — M54.16 LUMBAR RADICULOPATHY: ICD-10-CM

## 2025-05-21 DIAGNOSIS — M54.81 BILATERAL OCCIPITAL NEURALGIA: ICD-10-CM

## 2025-05-21 PROCEDURE — 97110 THERAPEUTIC EXERCISES: CPT | Mod: GP

## 2025-05-21 NOTE — PROGRESS NOTES
"  Physical Therapy  Physical Therapy Treatment Note    Patient Name: Tere Moreira  MRN: [id number]  Today's Date: 2025  Time Calculation  Start Time: 1618  Stop Time: 1700  Time Calculation (min): 42 min    Insurance:  Visit number: [facility]  Authorization info: No Auth  Insurance Type: Aetna Medicare  Medicare Certification Period: Beginnin2025 Endin2025  Medicare Re-Certification Period: Beginnin2025 Endin2025  Onset date: 2024     General:  Reason for visit: Lumbar Radiculopathy, Cervical Neuritis  Referred by: Kim Sargent MD, PhD     New:  Reason for visit: Hip and Knee OA  Referred by: Wilfredo Kramer MD    Current Problem  1. Bilateral hip pain        2. Chronic pain of both knees        3. Lumbar radiculopathy  Follow Up In Physical Therapy      4. Cervical neuritis  Follow Up In Physical Therapy      5. Bilateral occipital neuralgia  Follow Up In Physical Therapy            Precautions: STEADI Fall Risk Score (The score of 4 or more indicates an increased risk of falling): 1       Subjective:     Patient reports that the pain has been pretty manageable lately.  The left knee still feels pretty good following the injection.  The right knee will be injected on 2025. The hips are feeling pretty good today.  Started Gabapentin on 25, and it has helped.     Pain  2/10, R hip, LLE sciatica    Performing HEP?: Yes      Objective:   Reduced L hip IR ROM vs R      Treatment Performed:    Therapeutic Exercise:    40 min  R. stepper L3 5'  TRX squat + row 2x10  TRX inverted row  2x10  Resisted hip abduction 22# 3x10 R/L  Resisted hip flexion 22# 3x10  Tball bridges 5\" hold 1'x 2  Clamshells 3x10 R/L  Reverse clamshells 3x10 R/L  Marching bridge 2x10  DLS unsupported march 60\"x1, 30\" x 2  Standing quad stretch 30\" R/L    Neuromuscular Re-education:  5 min  Bottoms up with 2kg KB with march 2x30\"   SLS with \"kickstand\" and 2kg KB swing 10x cw/ccw R/L        " Personalized Home Exercise Program:  Access Code: [email or web address]  URL: [email or web address]  Date: 04/30/2025  Prepared by: Azalea Bermeo     Exercises  - Seated Cervical Retraction and Rotation  - 1 x daily - 7 x weekly - 10 reps  - Seated Cervical Sidebending AROM  - 1 x daily - 7 x weekly - 10 reps  - Seated Levator Scapulae Stretch  - 1 x daily - 7 x weekly - 3 reps - 20 seconds hold  - Seated Scapular Retraction  - 1 x daily - 7 x weekly - 20 reps - 5 seconds hold  - Standing Shoulder Horizontal Abduction with Resistance  - 3 x weekly - 3 sets - 10 reps  - Standing Shoulder External Rotation with Resistance  - 3 x weekly - 3 sets - 10 reps  - Supine Cervical Retraction with Towel  - 3 x weekly - 10 reps - 3 seconds hold  - Supine Pectoralis Stretch  - 3 x weekly - 3 reps - 30 seconds hold  - Sidelying Open Book Thoracic Lumbar Rotation and Extension  - 3 x weekly - 2 sets - 10 reps  - Supine Single Knee to Chest Stretch  - 3 x weekly - 3 reps - 30 second hold  - Supine Posterior Pelvic Tilt  - 3 x weekly - 20 reps - 5 seconds hold  - Supine Bridge  - 3 x weekly - 3 sets - 10 reps - 5 seconds hold  - Clamshell  - 3 x weekly - 3 sets - 10 reps  - Prone Quad Stretch with Towel Roll and Strap  - 1 x daily - 7 x weekly - 3 reps - 30 seconds hold     Charges: TE x 3    Assessment:   Did well with the exercises performed today, including the new TRX exercises.  No pain exacerbation with exercise.  Still challenged with SLS with KB swing.     Plan:  Continue to address hip/core strength and stability, LE strength, and flexibility to address hip pain, knee pain, and sciatic pain.  Revisit neck exercises as needed.       Azalea Bermeo, PT

## 2025-05-28 ENCOUNTER — TREATMENT (OUTPATIENT)
Dept: PHYSICAL THERAPY | Facility: CLINIC | Age: 65
End: 2025-05-28
Payer: MEDICARE

## 2025-05-28 DIAGNOSIS — M25.561 CHRONIC PAIN OF BOTH KNEES: ICD-10-CM

## 2025-05-28 DIAGNOSIS — M54.12 CERVICAL NEURITIS: ICD-10-CM

## 2025-05-28 DIAGNOSIS — M25.551 BILATERAL HIP PAIN: Primary | ICD-10-CM

## 2025-05-28 DIAGNOSIS — M25.552 BILATERAL HIP PAIN: Primary | ICD-10-CM

## 2025-05-28 DIAGNOSIS — M54.16 LUMBAR RADICULOPATHY: ICD-10-CM

## 2025-05-28 DIAGNOSIS — M25.562 CHRONIC PAIN OF BOTH KNEES: ICD-10-CM

## 2025-05-28 DIAGNOSIS — G89.29 CHRONIC PAIN OF BOTH KNEES: ICD-10-CM

## 2025-05-28 DIAGNOSIS — M54.81 BILATERAL OCCIPITAL NEURALGIA: ICD-10-CM

## 2025-05-28 PROCEDURE — 97110 THERAPEUTIC EXERCISES: CPT | Mod: GP

## 2025-05-28 NOTE — PROGRESS NOTES
"  Physical Therapy  Physical Therapy Treatment Note    Patient Name: Tere Moreira  MRN: [id number]  Today's Date: 2025  Time Calculation  Start Time: 1324  Stop Time: 1405  Time Calculation (min): 41 min    Insurance:  Visit [place]: [place]  Authorization info: No Auth  Insurance Type: Aetna Medicare  Medicare Certification Period: Beginnin2025 Endin2025  Medicare Re-Certification Period: Beginnin2025 Endin2025  Onset date: 2024     General:  Reason for visit: Lumbar Radiculopathy, Cervical Neuritis  Referred by: Kim Sargent MD, PhD     New:  Reason for visit: Hip and Knee OA  Referred by: Wilfredo Kramer MD    Current Problem  1. Bilateral hip pain        2. Chronic pain of both knees        3. Bilateral occipital neuralgia  Follow Up In Physical Therapy      4. Cervical neuritis  Follow Up In Physical Therapy      5. Lumbar radiculopathy  Follow Up In Physical Therapy              Precautions: STEADI Fall Risk Score (The score of 4 or more indicates an increased risk of falling): 1       Subjective:     Patient reports that the pain has been pretty manageable lately.  The left knee still feels pretty good following the injection.  The right knee will be injected on 2025. The hips are feeling pretty good today.  Started Gabapentin on 25, and it has helped.     Notes muscle soreness after last session.     Pain  1/10 R knee    Performing HEP?: Yes      Objective:   Reduced L hip IR ROM vs R      Treatment Performed:    Therapeutic Exercise:    41 min  R. stepper L3 5'  TRX squat + row 2x10  TRX inverted row  2x10  Resisted hip abduction 22# 15 R/L  Resisted hip flexion 22# 3x10  Tball bridges 5\" hold 1'x 2  Clamshells 2 x10 R/L  Reverse clamshells 2 x10 R/L  Marching bridge 2x10  DLS unsupported \" x 3  Sidelying hip circles 30\" x 2 R/L  Standing quad stretch 30\" R/L--not performed      Personalized Home Exercise Program:  Access Code: " [email or web address]  URL: [email or web address]  Date: 04/30/2025  Prepared by: Azalea Bermeo     Exercises  - Seated Cervical Retraction and Rotation  - 1 x daily - 7 x weekly - 10 reps  - Seated Cervical Sidebending AROM  - 1 x daily - 7 x weekly - 10 reps  - Seated Levator Scapulae Stretch  - 1 x daily - 7 x weekly - 3 reps - 20 seconds hold  - Seated Scapular Retraction  - 1 x daily - 7 x weekly - 20 reps - 5 seconds hold  - Standing Shoulder Horizontal Abduction with Resistance  - 3 x weekly - 3 sets - 10 reps  - Standing Shoulder External Rotation with Resistance  - 3 x weekly - 3 sets - 10 reps  - Supine Cervical Retraction with Towel  - 3 x weekly - 10 reps - 3 seconds hold  - Supine Pectoralis Stretch  - 3 x weekly - 3 reps - 30 seconds hold  - Sidelying Open Book Thoracic Lumbar Rotation and Extension  - 3 x weekly - 2 sets - 10 reps  - Supine Single Knee to Chest Stretch  - 3 x weekly - 3 reps - 30 second hold  - Supine Posterior Pelvic Tilt  - 3 x weekly - 20 reps - 5 seconds hold  - Supine Bridge  - 3 x weekly - 3 sets - 10 reps - 5 seconds hold  - Clamshell  - 3 x weekly - 3 sets - 10 reps  - Prone Quad Stretch with Towel Roll and Strap  - 1 x daily - 7 x weekly - 3 reps - 30 seconds hold     Charges: TE x 3    Assessment:   Did well with the exercises performed today with moderate fatigue but without pain production.      Plan:  Continue to address hip/core strength and stability, LE strength, and flexibility to address hip pain, knee pain, and sciatic pain.  Revisit neck exercises as needed.  Knee injection on 6/2/2025.      Azalea Bermeo, PT

## 2025-06-02 ENCOUNTER — OFFICE VISIT (OUTPATIENT)
Dept: ORTHOPEDIC SURGERY | Facility: CLINIC | Age: 65
End: 2025-06-02
Payer: MEDICARE

## 2025-06-02 DIAGNOSIS — M25.561 ACUTE PAIN OF RIGHT KNEE: Primary | ICD-10-CM

## 2025-06-02 DIAGNOSIS — M17.11 PRIMARY OSTEOARTHRITIS OF RIGHT KNEE: ICD-10-CM

## 2025-06-02 PROCEDURE — 2500000004 HC RX 250 GENERAL PHARMACY W/ HCPCS (ALT 636 FOR OP/ED): Performed by: ORTHOPAEDIC SURGERY

## 2025-06-02 PROCEDURE — 99213 OFFICE O/P EST LOW 20 MIN: CPT | Mod: 25 | Performed by: ORTHOPAEDIC SURGERY

## 2025-06-02 PROCEDURE — 20610 DRAIN/INJ JOINT/BURSA W/O US: CPT | Mod: RT | Performed by: ORTHOPAEDIC SURGERY

## 2025-06-02 PROCEDURE — 99213 OFFICE O/P EST LOW 20 MIN: CPT | Performed by: ORTHOPAEDIC SURGERY

## 2025-06-02 RX ORDER — LIDOCAINE HYDROCHLORIDE 10 MG/ML
5 INJECTION, SOLUTION INFILTRATION; PERINEURAL
Status: COMPLETED | OUTPATIENT
Start: 2025-06-02 | End: 2025-06-02

## 2025-06-02 RX ORDER — TRIAMCINOLONE ACETONIDE 40 MG/ML
40 INJECTION, SUSPENSION INTRA-ARTICULAR; INTRAMUSCULAR
Status: COMPLETED | OUTPATIENT
Start: 2025-06-02 | End: 2025-06-02

## 2025-06-02 RX ADMIN — LIDOCAINE HYDROCHLORIDE 5 ML: 10 INJECTION, SOLUTION INFILTRATION; PERINEURAL at 17:00

## 2025-06-02 RX ADMIN — TRIAMCINOLONE ACETONIDE 40 MG: 40 INJECTION, SUSPENSION INTRA-ARTICULAR; INTRAMUSCULAR at 17:00

## 2025-06-02 NOTE — PROGRESS NOTES
ORTHOPAEDIC HISTORY AND PHYSICAL    History Of Present Illness  Patient is a 65 y.o. female presenting to the clinic with right knee pain.  I saw her few weeks ago, she got left knee corticosteroid injection with excellent relief. She presents today reporting right knee pain is persistent.  She is very active.  She is able to walk but this is impeding her ability to do that.she presents today for evaluation of the right knee.    Physical Exam  The patient is well-nourished and well-developed and in no acute distress. The patient displayed normal mood and affect. The patient's pupils were equal, round sclerae are white. Patient's respirations appear to be regular and unlabored.     Exam of the right knee revealed no effusion in the knee and skin shows Within normal limits for age and diagnosis or no signs of lymphangitis. The knee demonstrated varus alignment. There was tenderness about the knee, thigh or calf. There was tenderness at the lateral joint space. There was discomfort with patellofemoral compression. The knee came to within 5 degress of full extension. Straight leg raising was without lag, flexion was to 110 degrees and ligamentous stability was full. The neurovascular examination of extremity was intact. The neurological exam including motor and sensory exam was performed. The vascular examination including palpitation of pulses and capillary refill of the foot was performed and determined to be intact       Relevant Results   Imaging: Multiple views of the  patient's right knee and it reveals right knee osteoarthritis and osteophyte formation and joint space narrowing.    Assessment/Plan   Tere Moreira is a 65 y.o. female presenting with severe right knee arthritis. She underwent left knee steroid injection with excellent results. She presents today for right knee pain.  I discussed treatment options with patient.  I discussed with patient that would likely benefit from arthroplasty in the future  however I recommend exhausting conservative pain at this time. Patient will continue to take over-the-counter anti-inflammatories as needed.   We discussed intra-articular corticosteroid injection and patient was very interested .  We  performed right knee injection today. Patient tolerated the procedure well.  She will come back in the future as early as in 3 months for repeat injection. The patient understands that, due to being prediabetic, we will proceed with injections in one knee at a time. The patient agrees with this plan.    Patient ID: Tere Moreira is a 65 y.o. female.    L Inj/Asp: R knee on 6/2/2025 5:00 PM  Indications: pain  Details: 20 G needle, anterolateral approach  Medications: 40 mg triamcinolone acetonide 40 mg/mL; 5 mL lidocaine 10 mg/mL (1 %)  Procedure, treatment alternatives, risks and benefits explained, specific risks discussed. Consent was given by the patient. Immediately prior to procedure a time out was called to verify the correct patient, procedure, equipment, support staff and site/side marked as required. Patient was prepped and draped in the usual sterile fashion.           No follow-ups on file.  Scribe Attestation  By signing my name below, ICristy Scribe attest that this documentation has been prepared under the direction and in the presence of Wilfredo Kramer MD.

## 2025-06-04 ENCOUNTER — DOCUMENTATION (OUTPATIENT)
Dept: PHYSICAL THERAPY | Facility: CLINIC | Age: 65
End: 2025-06-04
Payer: MEDICARE

## 2025-06-04 ENCOUNTER — APPOINTMENT (OUTPATIENT)
Dept: PHYSICAL THERAPY | Facility: CLINIC | Age: 65
End: 2025-06-04
Payer: MEDICARE

## 2025-06-04 NOTE — PROGRESS NOTES
Therapy Communication Note    Patient Name: Tere Moreira  MRN: [id number]  Department:   Rehab Services  Today's Date: 6/4/2025     Discipline: Physical Therapy    Missed Type: Cancel    Comment: Canceled via [employer]

## 2025-06-09 NOTE — PROGRESS NOTES
"  Physical Therapy  Physical Therapy Treatment Note    Patient Name: Tere Moreira  MRN: [id number]  Today's Date: 2025  Time Calculation  Start Time: 1010  Stop Time: 1050  Time Calculation (min): 40 min    Insurance:  Visit number: [place] of [place]  Authorization info: No Auth  Insurance Type: Aetna Medicare  Medicare Certification Period: Beginnin2025 Endin2025  Medicare Re-Certification Period: Beginnin2025 Endin2025  Onset date: 2024     General:  Reason for visit: Lumbar Radiculopathy, Cervical Neuritis  Referred by: Kim Sargent MD, PhD     New:  Reason for visit: Hip and Knee OA  Referred by: Wilfredo Kramer MD    Current Problem  1. Bilateral hip pain        2. Chronic pain of both knees        3. Bilateral occipital neuralgia        4. Cervical neuritis        5. Lumbar radiculopathy              Precautions: STEADI Fall Risk Score (The score of 4 or more indicates an increased risk of falling): 1       Subjective:     Patient reports that she is feeling great after the right knee injection.  Seems it helped hip as well.  Notes the neck has continued to feel good.  Has so much more ROM than previously.       Felt good after last session.     Pain  1/10 \"discomfort\" in knees    Performing HEP?: Yes      Objective:   Reduced L hip IR ROM vs R      Treatment Performed:    Therapeutic Exercise:    36 min  R. stepper L3 5'  Resisted hip abduction 22# 3x10  R/L  Resisted hip flexion 22# 3x10  Squat with 7.5# bar 2x10  TRX inverted row  3 x10  Bridge with adduction 5\" hold 2x10  Marching bridge 2x10  Clamshells 2 x10 R/L  Reverse clamshells 2 x10 R/L  Sidelying hip circles 30\" x 2 R/L  Standing quad stretch 30\" R/L    Neuromuscular Re-ed:   4 min  SLS with KB swing cw/ccw 10x R/L      Personalized Home Exercise Program:  Access Code: [email or web address]  URL: [email or web address]  Date: 2025  Prepared by: Azalea Bermeo     Exercises  - Seated " Cervical Retraction and Rotation  - 1 x daily - 7 x weekly - 10 reps  - Seated Cervical Sidebending AROM  - 1 x daily - 7 x weekly - 10 reps  - Seated Levator Scapulae Stretch  - 1 x daily - 7 x weekly - 3 reps - 20 seconds hold  - Seated Scapular Retraction  - 1 x daily - 7 x weekly - 20 reps - 5 seconds hold  - Standing Shoulder Horizontal Abduction with Resistance  - 3 x weekly - 3 sets - 10 reps  - Standing Shoulder External Rotation with Resistance  - 3 x weekly - 3 sets - 10 reps  - Supine Cervical Retraction with Towel  - 3 x weekly - 10 reps - 3 seconds hold  - Supine Pectoralis Stretch  - 3 x weekly - 3 reps - 30 seconds hold  - Sidelying Open Book Thoracic Lumbar Rotation and Extension  - 3 x weekly - 2 sets - 10 reps  - Supine Single Knee to Chest Stretch  - 3 x weekly - 3 reps - 30 second hold  - Supine Posterior Pelvic Tilt  - 3 x weekly - 20 reps - 5 seconds hold  - Supine Bridge  - 3 x weekly - 3 sets - 10 reps - 5 seconds hold  - Clamshell  - 3 x weekly - 3 sets - 10 reps  - Prone Quad Stretch with Towel Roll and Strap  - 1 x daily - 7 x weekly - 3 reps - 30 seconds hold     Charges: TE x 3    Assessment:   Symptoms in hips, knees, neck and back all well managed.  Very consistent with exercises.  Did well with the exercises performed today without pain production.      Plan:  1 more visit on 6/23/2025, then will discharge to [facility].     Azalea Bermeo, PT

## 2025-06-11 ENCOUNTER — TREATMENT (OUTPATIENT)
Dept: PHYSICAL THERAPY | Facility: CLINIC | Age: 65
End: 2025-06-11
Payer: MEDICARE

## 2025-06-11 DIAGNOSIS — M54.16 LUMBAR RADICULOPATHY: ICD-10-CM

## 2025-06-11 DIAGNOSIS — M25.562 CHRONIC PAIN OF BOTH KNEES: ICD-10-CM

## 2025-06-11 DIAGNOSIS — M25.551 BILATERAL HIP PAIN: Primary | ICD-10-CM

## 2025-06-11 DIAGNOSIS — M54.81 BILATERAL OCCIPITAL NEURALGIA: ICD-10-CM

## 2025-06-11 DIAGNOSIS — M25.552 BILATERAL HIP PAIN: Primary | ICD-10-CM

## 2025-06-11 DIAGNOSIS — M25.561 CHRONIC PAIN OF BOTH KNEES: ICD-10-CM

## 2025-06-11 DIAGNOSIS — M54.12 CERVICAL NEURITIS: ICD-10-CM

## 2025-06-11 DIAGNOSIS — G89.29 CHRONIC PAIN OF BOTH KNEES: ICD-10-CM

## 2025-06-11 PROCEDURE — 97110 THERAPEUTIC EXERCISES: CPT | Mod: GP

## 2025-06-23 ENCOUNTER — TREATMENT (OUTPATIENT)
Dept: PHYSICAL THERAPY | Facility: CLINIC | Age: 65
End: 2025-06-23
Payer: MEDICARE

## 2025-06-23 DIAGNOSIS — M54.16 LUMBAR RADICULOPATHY: ICD-10-CM

## 2025-06-23 DIAGNOSIS — M25.562 CHRONIC PAIN OF BOTH KNEES: ICD-10-CM

## 2025-06-23 DIAGNOSIS — M25.551 BILATERAL HIP PAIN: Primary | ICD-10-CM

## 2025-06-23 DIAGNOSIS — M25.561 CHRONIC PAIN OF BOTH KNEES: ICD-10-CM

## 2025-06-23 DIAGNOSIS — M25.552 BILATERAL HIP PAIN: Primary | ICD-10-CM

## 2025-06-23 DIAGNOSIS — G89.29 CHRONIC PAIN OF BOTH KNEES: ICD-10-CM

## 2025-06-23 DIAGNOSIS — M54.81 BILATERAL OCCIPITAL NEURALGIA: ICD-10-CM

## 2025-06-23 DIAGNOSIS — M54.12 CERVICAL NEURITIS: ICD-10-CM

## 2025-06-23 PROCEDURE — 97110 THERAPEUTIC EXERCISES: CPT | Mod: GP

## 2025-06-23 NOTE — PROGRESS NOTES
"  Physical Therapy  Physical Therapy Orthopedic Progress and Discharge Note    Patient Name: Tere Moreira  MRN: [id number]  Today's Date: 2025  Time Calculation  Start Time: 1048  Stop Time: 1126  Time Calculation (min): 38 min    Insurance:  Visit number: [place] of [place]  Authorization info: No Auth  Insurance Type: Aetna Medicare  Medicare Certification Period: Beginnin2025 Endin2025  Medicare Re-Certification Period: Beginnin2025 Endin2025  Onset date: 2024     General:  Reason for visit: Lumbar Radiculopathy, Cervical Neuritis  Referred by: Kim Sargent MD, PhD    New:  Reason for visit: Hip and Knee OA  Referred by: Wilfredo Kramer MD    Current Problem  1. Bilateral hip pain        2. Chronic pain of both knees        3. Bilateral occipital neuralgia        4. Cervical neuritis        5. Lumbar radiculopathy              Precautions: STEADI Fall Risk Score (The score of 4 or more indicates an increased risk of falling): 1       Subjective:     Patient reports that \"I feel great.\"  Symptoms in neck, back, hip and knees well managed.  No more headaches.  Improved mobility of the neck.  No pain when at rest.  Some stiffness in hips and knees when arising after prolonged sitting.  Working out regularly without limitation.     Neck symptoms have improved at least 90% since starting therapy.      Pain  Current: neck: 0/10, back 0/10, hips 0/10, knees 1/10  At worst: neck: 0/10, back 8/10, hip 2/10, knees 3/10    Exacerbating Factors: getting out of the car (only if driving over 2 hrs), prolonged sitting     Alleviating Factors: walking, movement    Functional Limitations: high impact activities, running    Self Reported Function (0-100%) = 95%  - 100% being back to PLOF    Performing HEP?: Yes      Objective:     Posture: Mild kyphosis and FH; level PSIS bilaterally    Gait: Stable without assistive device.  Non-antalgic    ROM  Cervical AROM (Degrees)     Flexion: " "56  Extension: 56  (L) Side Bend: 39  (R) Side Bend: 41  (L) Rotation: 67  (R) Rotation: 72     (L) Upper cervical Rotation: 40  (R) Upper cervical Rotation: 49      Lumbar AROM (%)    Flexion: 80%, hamstring pull  Extension: 100%  (R) Side Bend: 90%  (L) Side Bend: 100%  (R) Rotation: 100%  (L) Rotation: 100%      Hip AROM (Degrees)       (R)  (L)   Extension:  10  10    Abduction:  29  32      ER:   40  42     IR:   33  33        Hip PROM (Degrees)       (R)  (L)  Flexion:  140  140         Knee AROM (Degrees)       (R)  (L)  Flexion:  138  138   Extension:  0  0           Strength Testing    Hip     (R)  (L)  Flexion:  5  5     Extension:  4+  4+    Abduction:  5  5      Knee     (R)  (L)  Flexion:  5  5     Extension:  5  5    Ankle     (R)  (L)  Dorsiflexion:  5  5  Plantarflexion:   NT  NT     Core Control:  Pelvic Bridge: good lift height and core control         Flexibility       (R)  (L)  Hamstrings:  16  18  Hip Flexors:  Min paz Min paz  Quadriceps:  124  121       Functional Movement  Sit to stand: without UE assist  Squat: Reduced depth  Bed Mobility: Independent         Neuro:    Dermatomes: Intact to light touch  SLS Balance: R 9\", L 9\"      Special Tests    Radicular Symptoms: (-) LLE  Leg Length Discrepancy: (-)  SABINA Test: Normal range, without pain bilaterally    Outcome Measures: Updated 6/23/2025         Goals: Updated 6/23/2025  Resolved       PT Problem       PT Goal 1 (Met)       Start:  01/27/25    Expected End:  02/17/25    Resolved:  04/30/25    Demonstrate home exercise program adherence for management of symptoms.          PT Goal 2 (Met)       Start:  01/27/25    Expected End:  03/24/25    Resolved:  06/23/25    Cervical AROM WNL and without pain >1-2/10. Met  Bilateral shoulder strength measures of at least 4+/5. Progressing  Demonstrate good maintenance of neutral postural alignment throughout treatment session. Met  Sleep without interruption from neck pain. Met  Decrease headache " frequency from daily to 2x/week or less. Met  Patient will rate neck pain < 4/10 at worst to improve ADL tolerance. Met  Independent with home exercise program for symptom reduction and functional gains. Met         PT Goal 3 (Met)       Start:  04/30/25    Expected End:  07/29/25    Resolved:  06/23/25    Lumbar ROM increased to WFL to allow for improved functional mobility. Met  Hip strength measures improved to 5/5 for improved functional mobility. Nearly Met  Ascend/descend stairs without pain >4/10 back, hips, and knees. Met  Transfer in/out of car without pain > 2/10.  Met  Independent with HEP for functional progression and symptom management. Met             Treatment Performed:    Therapeutic Exercise:    38 min  Scifit 5'  Performed recheck  Reviewed HEP.        Personalized Home Exercise Program:  Access Code: [email or web address]  URL: [email or web address]  Date: 04/30/2025  Prepared by: Azalea Bermeo    Exercises  - Seated Cervical Retraction and Rotation  - 1 x daily - 7 x weekly - 10 reps  - Seated Cervical Sidebending AROM  - 1 x daily - 7 x weekly - 10 reps  - Seated Levator Scapulae Stretch  - 1 x daily - 7 x weekly - 3 reps - 20 seconds hold  - Seated Scapular Retraction  - 1 x daily - 7 x weekly - 20 reps - 5 seconds hold  - Standing Shoulder Horizontal Abduction with Resistance  - 3 x weekly - 3 sets - 10 reps  - Standing Shoulder External Rotation with Resistance  - 3 x weekly - 3 sets - 10 reps  - Supine Cervical Retraction with Towel  - 3 x weekly - 10 reps - 3 seconds hold  - Supine Pectoralis Stretch  - 3 x weekly - 3 reps - 30 seconds hold  - Sidelying Open Book Thoracic Lumbar Rotation and Extension  - 3 x weekly - 2 sets - 10 reps  - Supine Single Knee to Chest Stretch  - 3 x weekly - 3 reps - 30 second hold  - Supine Posterior Pelvic Tilt  - 3 x weekly - 20 reps - 5 seconds hold  - Supine Bridge  - 3 x weekly - 3 sets - 10 reps - 5 seconds hold  - Clamshell  - 3 x  weekly - 3 sets - 10 reps  - Prone Quad Stretch with Towel Roll and Strap  - 1 x daily - 7 x weekly - 3 reps - 30 seconds hold    EDUCATION: home exercise program, plan of care, activity modifications, pain management, and injury pathology       Charges: TE x 3    Assessment:  Mrs. Moreira has progressed very nicely in PT.  She is now having minimal pain.  She demonstrates good ROM, strength, and flexibility measures.  She is functioning very well without limitations due to pain symptoms.  She is very motivated to stay active and plans to continue with home exercises.     Plan of Care: Updated 6/23/2025     Discharge to [facility]    Azalea Bermeo, PT

## 2025-07-09 DIAGNOSIS — E78.5 HYPERLIPIDEMIA, UNSPECIFIED: Primary | ICD-10-CM

## 2025-07-09 RX ORDER — EZETIMIBE 10 MG/1
10 TABLET ORAL DAILY
Qty: 90 TABLET | Refills: 1 | Status: SHIPPED | OUTPATIENT
Start: 2025-07-09

## 2025-07-21 ENCOUNTER — NUTRITION (OUTPATIENT)
Dept: NUTRITION | Facility: CLINIC | Age: 65
End: 2025-07-21
Payer: MEDICARE

## 2025-07-21 VITALS — WEIGHT: 145 LBS | BODY MASS INDEX: 22.71 KG/M2

## 2025-07-21 DIAGNOSIS — R73.03 PRE-DIABETES: Primary | ICD-10-CM

## 2025-07-21 PROCEDURE — 97802 MEDICAL NUTRITION INDIV IN: CPT | Performed by: DIETITIAN, REGISTERED

## 2025-07-21 NOTE — PROGRESS NOTES
Nutrition Initial Assessment:     Patient Tere Moreira is a 65 y.o. female being seen at [facility]  who was referred by Iman Jack MD  on 05/15/25 for pre-diabetes.     1. Pre-diabetes [R73.03]                 Nutrition Assessment    Problem List[1]      Diagnosis    Abnormal cardiac CT angiography    Allergic rhinitis    Benign positional vertigo    Cervical radiculopathy    Hemorrhoids    Hyperlipemia    HTN (hypertension)    Leucopenia    Prediabetes    Vitamin D deficiency    Tinea cruris    Rectal bleed    Chronic pain of both knees    Dermatitis    Health care maintenance    Visit for screening mammogram    Encounter for screening for HIV    Encounter for hepatitis C screening test for low risk patient    Asymptomatic menopause    Colon cancer screening    Screening cholesterol level    Diabetes mellitus screening    Encounter for Papanicolaou smear for cervical cancer screening    Immunization due    Essential (primary) hypertension    Hyperlipidemia, unspecified    Change in skin mole    Primary osteoarthritis of both knees    Acute cystitis without hematuria    Increased urinary frequency    Lower abdominal pain    Microscopic hematuria    Bilateral occipital neuralgia    Bilateral hip pain    Right hand paresthesia    Chronic bilateral low back pain with bilateral sciatica    Medicare annual wellness visit, subsequent    Bug bite without infection    Radiculopathy of agtszxqw-agqwufo-wdocn region    Cervical neuritis    Lumbar radiculopathy    Left sciatic nerve pain       Medications:  Current Outpatient Medications   Medication Instructions    aspirin 81 mg, oral, Daily    atorvastatin (LIPITOR) 40 mg, oral, Nightly    betamethasone valerate (Valisone) 0.1 % cream Topical, 2 times daily, Apply topically to affected area on right posterior thigh bid. DO NOT USE ON FACE OR GROIN.    d-mannose 1 g, oral, 2 times daily    desonide (DesOwen) 0.05 % ointment Topical, 2 times  daily, to affected area    estradiol (Estrace) 0.01 % (0.1 mg/gram) vaginal cream Insert 1g vaginally twice weekly at bedtime    ezetimibe (ZETIA) 10 mg, oral, Daily    gabapentin (Neurontin) 100 mg capsule Take 1 capsule (100 mg) by mouth once daily at bedtime for 7 days, THEN 2 capsules (200 mg) once daily at bedtime for 7 days, THEN 3 capsules (300 mg) once daily at bedtime for 14 days.    ibuprofen 800 mg tablet TAKE 1 TABLET (800 MG) BY MOUTH EVERY 8 HOURS IF NEEDED FOR MILD PAIN (1 - 3) (TAKE WITH FOOD).    ketoconazole (NIZOral) 2 % cream Topical, 2 times daily, to affected area    losartan-hydrochlorothiazide (Hyzaar) 50-12.5 mg tablet 1 tablet, oral, Daily    tolterodine LA (DETROL LA) 4 mg, oral, Daily, Do not crush, chew, or split.        Nutrition History:  Food & Nutrition History:   Pt states she is here for pre-diabetes. A12 is at 6.0%.     Food Allergies: mangos;  Food Intolerances: None  Vitamin/mineral intake: None  Herbal supplements: None  Medication and Complementary/Alternative Medicine Use:   GI Symptoms: None  Mouth Issues: denies; Teeth Issues: no issues  Sleep Habits: 7+ hours disrupted    Pt wakes up at 7:00-8:00 am. She wakes up feeling good.   3 meals and 1 -2 snacks per day.  Diet Recall:  Meal 1: Breakfast is at 8:00-8:30 am to include Ezkiel bread with chicken sausage, 1 cheese, or almond butter   Snack:   Meal 2: Lunch is at noon to include 4-5 ounces of salmon with rice cauliflower or salad   Snack: Energy drops in the afternoon. She consumes strawberries and 0% greek yogurt.   Meal 3: Dinner can be between 6:00-8:00 to include 4 ounces of baked catfish, 1 cup of broccoli  Snack: snack may be at 9:00 to include strawberries, yogurt and protein powder  Food Variety: Present  Oral Nutrition Supplement Use: None   Fluid Intake:   Energy Intake: Good (>/= 75% EEN)      Food Preparation:  Cooking: Patient  Grocery Shopping: Patient  Dining Out: rare to none    Physical Activity:    Walking 4-5 miles 5 days a week and 3 -4 days a week she does resistance training.     Food Security/Insecurity: n/a    Anthropometrics:      Weight: 65.8 kg (145 lb)                     Weight History:   Daily Weight  07/21/25 : 65.8 kg (145 lb)  05/02/25 : 66.2 kg (146 lb)  03/04/25 : 67.1 kg (148 lb)  12/17/24 : 66.7 kg (147 lb)  10/23/24 : 66.7 kg (147 lb)  10/17/24 : 66.7 kg (147 lb)  10/07/24 : 66.7 kg (147 lb)  08/30/24 : 64.9 kg (143 lb)  08/02/24 : 66.7 kg (147 lb)  05/09/24 : 65.8 kg (145 lb)    Weight Change %:       Nutrition Focused Physical Exam Findings:  Subcutaneous Fat Loss:   Defer Subcutaneous Fat Loss Assessment: Defer all  Defer All Reason: visually appears nourished    Muscle Wasting:  Defer Muscle Wasting Assessment: Defer all  Defer All Reason: visually appears nourished    Physical Findings:  Hair: Negative  Eyes: Negative  Nails: Negative  Skin: Negative  Respiratory: Negative  Edema: none      Nutrition Significant Labs:  Last Chem:     Chemistry    Lab Results   Component Value Date/Time     05/07/2025 1013    K 4.2 05/07/2025 1013     05/07/2025 1013    CO2 26 05/07/2025 1013    BUN 28 (H) 05/07/2025 1013    CREATININE 0.66 05/07/2025 1013    Lab Results   Component Value Date/Time    CALCIUM 9.3 05/07/2025 1013    ALKPHOS 42 05/07/2025 1013    AST 22 05/07/2025 1013    ALT 18 05/07/2025 1013    BILITOT 0.6 05/07/2025 1013       , CMP Trend:    Recent Labs     05/07/25  1013 10/05/23  1621 10/29/22  1101   GLUCOSE 85 84 97    139 144   K 4.2 4.3 4.9    101 108*   CO2 26 29 29   ANIONGAP 10 13 12   BUN 28* 23 15   CREATININE 0.66 0.89 0.71   EGFR 97 73  --    CALCIUM 9.3 9.9 9.6   ALBUMIN 4.5 4.6 4.2   ALKPHOS 42 50 49   PROT 7.2 7.5 6.9   AST 22 27 29   BILITOT 0.6 0.4 0.7   ALT 18 22 30   , CBC Trend:   Recent Labs     05/07/25  1013 10/05/23  1621 10/29/22  1101 07/16/21  1102   WBC 8.4 3.8* 3.0* 3.2*   NRBC  --  0.0 0.0 0.0   RBC 4.41 4.51 4.56 4.29   HGB  "11.9 12.3 12.0 12.1   HCT 37.3 39.1 39.8 37.0   MCV 84.6 87 87 86   MCH 27.0 27.3  --   --    MCHC 31.9* 31.5* 30.2* 32.7   RDW 13.2 13.4 13.7 14.1    239 235 256   , RFP + Serum Mag Trend:   Recent Labs     05/07/25  1013 10/05/23  1621 10/29/22  1101   GLUCOSE 85 84 97    139 144   K 4.2 4.3 4.9    101 108*   CO2 26 29 29   ANIONGAP 10 13 12   BUN 28* 23 15   CREATININE 0.66 0.89 0.71   EGFR 97 73  --    CALCIUM 9.3 9.9 9.6   ALBUMIN 4.5 4.6 4.2   , LFT Trend:   Recent Labs     05/07/25  1013 10/05/23  1621 10/29/22  1101 10/17/20  1119 01/13/20  2158 07/31/19  1135 07/12/19  1113   ALBUMIN 4.5 4.6 4.2   < > 4.3   < > 4.6   BILITOT 0.6 0.4 0.7   < > 0.3   < > 0.5   BILIDIR  --   --   --   --  0.0  --  0.1   ALKPHOS 42 50 49   < > 40   < > 52   ALT 18 22 30   < > 34   < > 14   AST 22 27 29   < > 25   < > 21   PROT 7.2 7.5 6.9   < > 7.6   < > 7.5    < > = values in this interval not displayed.   , DM Specific Labs Trend (Includes HgbA1C, antibodies & fasting insulin):   Recent Labs     05/07/25  1013 10/05/23  1621 10/29/22  1101   HGBA1C 6.0* 5.9* 5.4   , Lipid Panel Trend:    Recent Labs     05/07/25  1013 10/29/22  1101 07/16/21  1102 05/22/21  1045   CHOL 149 134 138 139   HDL 79 75.7 67.1 69.0   LDLCALC 56  --   --   --    LDLF  --  53 64 63   VLDL  --  6 7 7   TRIG 48 28* 34 36   , Iron Panel + Serum Ferritin Trend: No results for input(s): \"IRON\", \"UIBC\", \"TIBC\", \"IRONSAT\", \"FERRITIN\" in the last 73284 hours., Vitamin B12: No results found for: \"JOTBFIXX33\" , Folate: No results found for: \"FOLATE\" , Vitamin D: No results found for: \"VITD25\" , and CRP Trend (last 3): No results found for: \"HSCRP\"       Estimated Needs:  Total Energy Estimated Needs in 24 hours (kCal): 1650 kCal;     ;     ;    Total Fluid Estimated Needs in 24 Hours (mL): 1650 mL;                    Nutrition Diagnosis        Nutrition Diagnosis  Patient has Nutrition Diagnosis: Yes  Diagnosis Status (1): New  Nutrition " "Diagnosis 1: Food and nutrition related knowledge deficit  Related to (1): how to eat for pre-diabetes with A1c at 6.0%  As Evidenced by (1): reports by pt of the need to learn.       Nutrition Interventions/Recommendations   Nutrition Prescription: Oral nutrition CHO consistent meal plan    Nutrition Interventions:   Food and Nutrient Delivery: Meals & Snacks: Modification of schedule of oral intake, Modify composition of oral intake  Goals: 1. 3 meals and 1 -2 snacks per day. Eating three meals per day can increase metabolism, this is called the thermal effect of eating. Eating three meals burns more calories than two meals consumed per day. Strive to eat breakfast within 1 -2 hours of waking to jump start the metabolism and stabilize blood  glucose. Aim for breakfast at 8:00, lunch at noon, snack at 4:00 or within 1 an hour of exercise, dinner at 6:00-7:00 and a bedtime snack at 10:00 pm.  Having a bedtime snack can help stabilize sleep.   2. Strive to include protein in the meals and even snacks. Protein in meals can increase metabolism too.  Protein in snacks can sustain energy, stabilize blood glucose, and provide more contentment. Protein foods include eggs, egg whites, cheese, nuts, nut butters, Greek yogurt, poultry, meat, fish, tofu, plant-based protein powder, and/or plant-based protein drinks.      3. The plate method was recommended to help portion foods at meals and to structure. Using a 9\" plate, recommended for 1/2 of the plate to include non-starchy vegetable and suggested to consume this first.  Recommended protein to be included but limited to 3 ounces at meals. Recommended 1/4 of the plate or 1 cup of grain or starch with fruit, milk or yogurt at meals. For more information on the plate method visit myplate.gov.     Coordination of Care:       Nutrition Education:   Nutrition Education Content: Content related nutrition education  use of the plate method    Nutrition Education Topics Discussed: " "  Meal pattern and its influence on diabetes.     Educational Handouts Provided: Plate Method     Nutrition Counseling:   Nutrition Counseling Strategies : Nutrition counseling based on motivational interviewing strategy, Nutrition counseling based on goal setting strategy    Patient Goals:   3 meals and 1 -2 snacks per day. Eating three meals per day can increase metabolism, this is called the thermal effect of eating. Eating three meals burns more calories than two meals consumed per day. Strive to eat breakfast within 1 -2 hours of waking to jump start the metabolism and stabilize blood  glucose. Aim for breakfast at 8:00, lunch at noon, snack at 4:00 or within 1 an hour of exercise, dinner at 6:00-7:00 and a bedtime snack at 10:00 pm.  Having a bedtime snack can help stabilize sleep.   Strive to include protein in the meals and even snacks. Protein in meals can increase metabolism too.  Protein in snacks can sustain energy, stabilize blood glucose, and provide more contentment. Protein foods include eggs, egg whites, cheese, nuts, nut butters, Greek yogurt, poultry, meat, fish, tofu, plant-based protein powder, and/or plant-based protein drinks.      The plate method was recommended to help portion foods at meals and to structure. Using a 9\" plate, recommended for 1/2 of the plate to include non-starchy vegetable and suggested to consume this first.  Recommended protein to be included but limited to 3 ounces at meals. Recommended 1/4 of the plate or 1 cup of grain or starch with fruit, milk or yogurt at meals. For more information on the plate method visit myplate.gov.       Readiness to Change : Good  Level of Understanding : Good  Anticipated Compliant : Good         Nutrition Monitoring and Evaluation   Food and Nutrient Intake  Monitoring and Evaluation Plan: Meal/snack pattern, Carbohydrate intake, Protein intake  Meal/Snack Pattern: Estimated meal and snack pattern  Criteria: 3 meals and 2 snacks per " day  Criteria: at all meals and snacks  Criteria: CHO consistency              Biochemical Data, Medical Tests and Procedures  Monitoring and Evaluation Plan: Glucose/endocrine profile  Glucose/Endocrine Profile: Hemoglobin A1c (HgbA1c)         Goal Status:           Follow Up: 1 month     Shannon Roblero MS, RDN, LD, LEROY, MB-EAT-P   Advanced Practice Clinical Dietitian   Mindfulness-Based Eating Awareness Training Practitioner   [facility]   [facility] [place]   [email or web address]   Scheduling Line [phone]   Direct Line [phone]              [1]   Patient Active Problem List  Diagnosis    Abnormal cardiac CT angiography    Allergic rhinitis    Benign positional vertigo    Cervical radiculopathy    Hemorrhoids    Hyperlipemia    HTN (hypertension)    Leucopenia    Prediabetes    Vitamin D deficiency    Tinea cruris    Rectal bleed    Chronic pain of both knees    Dermatitis    Health care maintenance    Visit for screening mammogram    Encounter for screening for HIV    Encounter for hepatitis C screening test for low risk patient    Asymptomatic menopause    Colon cancer screening    Screening cholesterol level    Diabetes mellitus screening    Encounter for Papanicolaou smear for cervical cancer screening    Immunization due    Essential (primary) hypertension    Hyperlipidemia, unspecified    Change in skin mole    Primary osteoarthritis of both knees    Acute cystitis without hematuria    Increased urinary frequency    Lower abdominal pain    Microscopic hematuria    Bilateral occipital neuralgia    Bilateral hip pain    Right hand paresthesia    Chronic bilateral low back pain with bilateral sciatica    Medicare annual wellness visit, subsequent    Bug bite without infection    Radiculopathy of rwfjlcrq-[id number]-wbydn region    Cervical neuritis    Lumbar radiculopathy    Left sciatic nerve pain

## 2025-07-21 NOTE — PATIENT INSTRUCTIONS
"3 meals and 1 -2 snacks per day. Eating three meals per day can increase metabolism, this is called the thermal effect of eating. Eating three meals burns more calories than two meals consumed per day. Strive to eat breakfast within 1 -2 hours of waking to jump start the metabolism and stabilize blood  glucose. Aim for breakfast at 8:00, lunch at noon, snack at 4:00 or within 1 an hour of exercise, dinner at 6:00-7:00 and a bedtime snack at 10:00 pm.  Having a bedtime snack can help stabilize sleep.   Strive to include protein in the meals and even snacks. Protein in meals can increase metabolism too.  Protein in snacks can sustain energy, stabilize blood glucose, and provide more contentment. Protein foods include eggs, egg whites, cheese, nuts, nut butters, Greek yogurt, poultry, meat, fish, tofu, plant-based protein powder, and/or plant-based protein drinks.      The plate method was recommended to help portion foods at meals and to structure. Using a 9\" plate, recommended for 1/2 of the plate to include non-starchy vegetable and suggested to consume this first.  Recommended protein to be included but limited to 3 ounces at meals. Recommended 1/4 of the plate or 1 cup of grain or starch with fruit, milk or yogurt at meals. For more information on the plate method visit myplate.gov.       Shannon Roblero, MS, RDN, LD, LEROY, MB-EAT-P   Advanced Practice Clinical Dietitian   Mindfulness-Based Eating Awareness Training Practitioner   [facility]   [facility] [place]   [email or web address]   Scheduling Line [phone]   Direct Line [phone]    "

## 2025-09-02 ENCOUNTER — APPOINTMENT (OUTPATIENT)
Dept: RADIOLOGY | Facility: CLINIC | Age: 65
End: 2025-09-02
Payer: MEDICARE